# Patient Record
Sex: FEMALE | Race: WHITE | HISPANIC OR LATINO | URBAN - METROPOLITAN AREA
[De-identification: names, ages, dates, MRNs, and addresses within clinical notes are randomized per-mention and may not be internally consistent; named-entity substitution may affect disease eponyms.]

---

## 2018-05-19 ENCOUNTER — EMERGENCY (EMERGENCY)
Facility: HOSPITAL | Age: 43
LOS: 1 days | Discharge: HOME | End: 2018-05-19
Attending: EMERGENCY MEDICINE
Payer: COMMERCIAL

## 2018-05-19 VITALS
TEMPERATURE: 98 F | OXYGEN SATURATION: 99 % | SYSTOLIC BLOOD PRESSURE: 145 MMHG | HEART RATE: 95 BPM | DIASTOLIC BLOOD PRESSURE: 89 MMHG | RESPIRATION RATE: 16 BRPM

## 2018-05-19 VITALS
DIASTOLIC BLOOD PRESSURE: 92 MMHG | HEIGHT: 65 IN | SYSTOLIC BLOOD PRESSURE: 144 MMHG | TEMPERATURE: 98 F | HEART RATE: 97 BPM | WEIGHT: 160.06 LBS | RESPIRATION RATE: 18 BRPM

## 2018-05-19 LAB
ALBUMIN SERPL ELPH-MCNC: 4.1 G/DL — SIGNIFICANT CHANGE UP (ref 3.3–5)
ALP SERPL-CCNC: 69 U/L — SIGNIFICANT CHANGE UP (ref 40–120)
ALT FLD-CCNC: 15 U/L — SIGNIFICANT CHANGE UP (ref 10–45)
ANION GAP SERPL CALC-SCNC: 12 MMOL/L — SIGNIFICANT CHANGE UP (ref 5–17)
AST SERPL-CCNC: 11 U/L — SIGNIFICANT CHANGE UP (ref 10–40)
BASOPHILS # BLD AUTO: 0.1 K/UL — SIGNIFICANT CHANGE UP (ref 0–0.2)
BASOPHILS NFR BLD AUTO: 0.5 % — SIGNIFICANT CHANGE UP (ref 0–2)
BILIRUB SERPL-MCNC: 0.3 MG/DL — SIGNIFICANT CHANGE UP (ref 0.2–1.2)
BUN SERPL-MCNC: 9 MG/DL — SIGNIFICANT CHANGE UP (ref 7–23)
CALCIUM SERPL-MCNC: 9.2 MG/DL — SIGNIFICANT CHANGE UP (ref 8.4–10.5)
CHLORIDE SERPL-SCNC: 101 MMOL/L — SIGNIFICANT CHANGE UP (ref 96–108)
CO2 SERPL-SCNC: 23 MMOL/L — SIGNIFICANT CHANGE UP (ref 22–31)
CREAT SERPL-MCNC: 0.63 MG/DL — SIGNIFICANT CHANGE UP (ref 0.5–1.3)
EOSINOPHIL # BLD AUTO: 0.5 K/UL — SIGNIFICANT CHANGE UP (ref 0–0.5)
EOSINOPHIL NFR BLD AUTO: 3.8 % — SIGNIFICANT CHANGE UP (ref 0–6)
GLUCOSE SERPL-MCNC: 88 MG/DL — SIGNIFICANT CHANGE UP (ref 70–99)
HCT VFR BLD CALC: 40 % — SIGNIFICANT CHANGE UP (ref 34.5–45)
HGB BLD-MCNC: 13.1 G/DL — SIGNIFICANT CHANGE UP (ref 11.5–15.5)
LYMPHOCYTES # BLD AUTO: 28.5 % — SIGNIFICANT CHANGE UP (ref 13–44)
LYMPHOCYTES # BLD AUTO: 3.4 K/UL — HIGH (ref 1–3.3)
MCHC RBC-ENTMCNC: 29 PG — SIGNIFICANT CHANGE UP (ref 27–34)
MCHC RBC-ENTMCNC: 32.9 GM/DL — SIGNIFICANT CHANGE UP (ref 32–36)
MCV RBC AUTO: 88.2 FL — SIGNIFICANT CHANGE UP (ref 80–100)
MONOCYTES # BLD AUTO: 0.9 K/UL — SIGNIFICANT CHANGE UP (ref 0–0.9)
MONOCYTES NFR BLD AUTO: 7.4 % — SIGNIFICANT CHANGE UP (ref 2–14)
NEUTROPHILS # BLD AUTO: 7 K/UL — SIGNIFICANT CHANGE UP (ref 1.8–7.4)
NEUTROPHILS NFR BLD AUTO: 59.8 % — SIGNIFICANT CHANGE UP (ref 43–77)
PLATELET # BLD AUTO: 308 K/UL — SIGNIFICANT CHANGE UP (ref 150–400)
POTASSIUM SERPL-MCNC: 3.8 MMOL/L — SIGNIFICANT CHANGE UP (ref 3.5–5.3)
POTASSIUM SERPL-SCNC: 3.8 MMOL/L — SIGNIFICANT CHANGE UP (ref 3.5–5.3)
PROT SERPL-MCNC: 7.2 G/DL — SIGNIFICANT CHANGE UP (ref 6–8.3)
RBC # BLD: 4.54 M/UL — SIGNIFICANT CHANGE UP (ref 3.8–5.2)
RBC # FLD: 11.8 % — SIGNIFICANT CHANGE UP (ref 10.3–14.5)
SODIUM SERPL-SCNC: 136 MMOL/L — SIGNIFICANT CHANGE UP (ref 135–145)
TROPONIN T SERPL-MCNC: <0.01 NG/ML — SIGNIFICANT CHANGE UP (ref 0–0.06)
TROPONIN T SERPL-MCNC: <0.01 NG/ML — SIGNIFICANT CHANGE UP (ref 0–0.06)
WBC # BLD: 11.7 K/UL — HIGH (ref 3.8–10.5)
WBC # FLD AUTO: 11.7 K/UL — HIGH (ref 3.8–10.5)

## 2018-05-19 PROCEDURE — 71046 X-RAY EXAM CHEST 2 VIEWS: CPT | Mod: 26

## 2018-05-19 PROCEDURE — 93005 ELECTROCARDIOGRAM TRACING: CPT

## 2018-05-19 PROCEDURE — 80053 COMPREHEN METABOLIC PANEL: CPT

## 2018-05-19 PROCEDURE — 99053 MED SERV 10PM-8AM 24 HR FAC: CPT

## 2018-05-19 PROCEDURE — 99283 EMERGENCY DEPT VISIT LOW MDM: CPT

## 2018-05-19 PROCEDURE — 71046 X-RAY EXAM CHEST 2 VIEWS: CPT

## 2018-05-19 PROCEDURE — 85027 COMPLETE CBC AUTOMATED: CPT

## 2018-05-19 PROCEDURE — 93010 ELECTROCARDIOGRAM REPORT: CPT

## 2018-05-19 PROCEDURE — 84484 ASSAY OF TROPONIN QUANT: CPT

## 2018-05-19 PROCEDURE — 99285 EMERGENCY DEPT VISIT HI MDM: CPT | Mod: 25

## 2018-05-19 RX ORDER — ASPIRIN/CALCIUM CARB/MAGNESIUM 324 MG
325 TABLET ORAL ONCE
Qty: 0 | Refills: 0 | Status: COMPLETED | OUTPATIENT
Start: 2018-05-19 | End: 2018-05-19

## 2018-05-19 RX ORDER — KETOROLAC TROMETHAMINE 30 MG/ML
15 SYRINGE (ML) INJECTION ONCE
Qty: 0 | Refills: 0 | Status: DISCONTINUED | OUTPATIENT
Start: 2018-05-19 | End: 2018-05-19

## 2018-05-19 RX ADMIN — Medication 325 MILLIGRAM(S): at 08:08

## 2018-05-19 NOTE — ED PROVIDER NOTE - MEDICAL DECISION MAKING DETAILS
Jonathan Weil, PGY1 - HEART score = 1, low risk for ACS, PERC negative. Will check serial Daisha, provide ASA.

## 2018-05-19 NOTE — ED ADULT NURSE NOTE - OBJECTIVE STATEMENT
42 y f came to the ed c/o chest pain and back pain. states the chest pain started 3 days ago and the back pain has been a long time due to heavy lifting at her job. states the pain is worse with movement but improves with rest. 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 denies any fevers, chills, sob. abdomen is soft and nontender. denies any n/v/d. skin is warm and dry.

## 2018-05-19 NOTE — ED PROVIDER NOTE - OBJECTIVE STATEMENT
42F no sig PMH p/w intermittent right parasternal chest pain for 2 days. She describes a sensation of pressure, worse with deep inspiration, associated with dyspnea and 1x brief episode nausea last night. No vomiting/diaphoresis. Also c/o chronic diffuse back pain for months-years, unchanged from baseline. 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 She has not tried any meds for relief. No hx DM/HTN/high cholesterol/smoking. No Fhx early cardiac disease. No hx malignancy, no recent travel or surgeries, no leg pain, +chronic BLE edema for months.

## 2018-05-19 NOTE — ED PROVIDER NOTE - PROGRESS NOTE DETAILS
Jonathan Weil, PGY1 - patient feeling better. Trops negative x2. DC home with analgesia, pcp f/u, return precautions.

## 2018-05-19 NOTE — ED PROVIDER NOTE - ATTENDING CONTRIBUTION TO CARE
42F no pmhx presents to the ED for chest pain. Pain along right sternal border pressure constant non-radiating. started 2 days ago. worse with movement. 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 + nausea and slight dizziness. no f/c/sob/ha/abd pain/vomiting. no leg swelling no recent travel or surgeries. no skin rash. On exam pt well appearing clear lungs rrr abd soft non-tender no rebound or guarding no cvat no leg swelling. + ttp along right sternal border. The patient is low risk for a diagnosis of pulmonary embolism as demonstrated by being negative by the PERC rule: they are younger than 50, the pulse is <100, the oxygen saturation on room air is >95%, they have no history of prior venous thromboembolic disease, they have not had any recent surgery or significant trauma within the last 4 weeks, they deny hemoptysis, they do not take exogenous estrogen, and they do not have unilateral leg swelling. likely msk pain; heart score 0. will delta trop and if negative will d/c with follow up.     Constitutional: No fever or chills  Eyes: No visual changes  HEENT: No throat pain  CV: + chest pain  Resp: No SOB no cough  GI: No abd pain, + nausea no vomiting  : No dysuria  MSK: No musculoskeletal pain  Skin: No rash  Neuro: No headache     Constitutional: NAD AAOx3  Eyes: PERRLA EOMI  Head: Normocephalic atraumatic  Mouth: MMM  Cardiac: regular rate  no LE swelling  Resp: Lungs CTAB  GI: Abd s/nt/nd  Neuro: CN2-12 intact  Skin: No rashes     Kush Lee M.D., Attending Physician

## 2018-06-25 PROBLEM — Z00.00 ENCOUNTER FOR PREVENTIVE HEALTH EXAMINATION: Status: ACTIVE | Noted: 2018-06-25

## 2018-06-26 ENCOUNTER — APPOINTMENT (OUTPATIENT)
Dept: MAMMOGRAPHY | Facility: IMAGING CENTER | Age: 43
End: 2018-06-26

## 2018-07-10 ENCOUNTER — OUTPATIENT (OUTPATIENT)
Dept: OUTPATIENT SERVICES | Facility: HOSPITAL | Age: 43
LOS: 1 days | End: 2018-07-10
Payer: COMMERCIAL

## 2018-07-10 ENCOUNTER — APPOINTMENT (OUTPATIENT)
Dept: MAMMOGRAPHY | Facility: IMAGING CENTER | Age: 43
End: 2018-07-10
Payer: COMMERCIAL

## 2018-07-10 DIAGNOSIS — Z00.8 ENCOUNTER FOR OTHER GENERAL EXAMINATION: ICD-10-CM

## 2018-07-10 PROCEDURE — 77067 SCR MAMMO BI INCL CAD: CPT | Mod: 26

## 2018-07-10 PROCEDURE — 77063 BREAST TOMOSYNTHESIS BI: CPT | Mod: 26

## 2018-07-10 PROCEDURE — 77067 SCR MAMMO BI INCL CAD: CPT

## 2018-07-10 PROCEDURE — 77063 BREAST TOMOSYNTHESIS BI: CPT

## 2018-09-25 ENCOUNTER — RESULT REVIEW (OUTPATIENT)
Age: 43
End: 2018-09-25

## 2018-10-12 ENCOUNTER — APPOINTMENT (OUTPATIENT)
Dept: MRI IMAGING | Facility: CLINIC | Age: 43
End: 2018-10-12

## 2019-02-08 ENCOUNTER — APPOINTMENT (OUTPATIENT)
Dept: MRI IMAGING | Facility: IMAGING CENTER | Age: 44
End: 2019-02-08
Payer: COMMERCIAL

## 2019-02-08 ENCOUNTER — OUTPATIENT (OUTPATIENT)
Dept: OUTPATIENT SERVICES | Facility: HOSPITAL | Age: 44
LOS: 1 days | End: 2019-02-08
Payer: COMMERCIAL

## 2019-02-08 DIAGNOSIS — Z00.8 ENCOUNTER FOR OTHER GENERAL EXAMINATION: ICD-10-CM

## 2019-02-08 PROCEDURE — 72197 MRI PELVIS W/O & W/DYE: CPT

## 2019-02-08 PROCEDURE — A9585: CPT

## 2019-02-08 PROCEDURE — 72197 MRI PELVIS W/O & W/DYE: CPT | Mod: 26

## 2019-07-01 ENCOUNTER — APPOINTMENT (OUTPATIENT)
Dept: MRI IMAGING | Facility: CLINIC | Age: 44
End: 2019-07-01
Payer: COMMERCIAL

## 2019-07-01 ENCOUNTER — OUTPATIENT (OUTPATIENT)
Dept: OUTPATIENT SERVICES | Facility: HOSPITAL | Age: 44
LOS: 1 days | End: 2019-07-01
Payer: COMMERCIAL

## 2019-07-01 DIAGNOSIS — G89.29 OTHER CHRONIC PAIN: ICD-10-CM

## 2019-07-01 DIAGNOSIS — Z00.8 ENCOUNTER FOR OTHER GENERAL EXAMINATION: ICD-10-CM

## 2019-07-01 DIAGNOSIS — M54.42 LUMBAGO WITH SCIATICA, LEFT SIDE: ICD-10-CM

## 2019-07-01 PROCEDURE — 72148 MRI LUMBAR SPINE W/O DYE: CPT

## 2019-07-01 PROCEDURE — 72148 MRI LUMBAR SPINE W/O DYE: CPT | Mod: 26

## 2019-09-26 ENCOUNTER — EMERGENCY (EMERGENCY)
Facility: HOSPITAL | Age: 44
LOS: 0 days | Discharge: HOME | End: 2019-09-27
Attending: EMERGENCY MEDICINE
Payer: OTHER MISCELLANEOUS

## 2019-09-26 VITALS
WEIGHT: 175.93 LBS | HEIGHT: 65 IN | OXYGEN SATURATION: 99 % | DIASTOLIC BLOOD PRESSURE: 96 MMHG | RESPIRATION RATE: 18 BRPM | TEMPERATURE: 97 F | SYSTOLIC BLOOD PRESSURE: 134 MMHG | HEART RATE: 95 BPM

## 2019-09-26 PROCEDURE — 99053 MED SERV 10PM-8AM 24 HR FAC: CPT

## 2019-09-26 PROCEDURE — 99284 EMERGENCY DEPT VISIT MOD MDM: CPT

## 2019-09-26 NOTE — ED ADULT TRIAGE NOTE - CHIEF COMPLAINT QUOTE
patient states I was working yesterday overnight, at 0630 hours and me and a nurse pulled a patient up and I sprained my lower back. also reports pelvic pain. took flexeril at 9pm

## 2019-09-27 VITALS
SYSTOLIC BLOOD PRESSURE: 132 MMHG | TEMPERATURE: 98 F | DIASTOLIC BLOOD PRESSURE: 88 MMHG | OXYGEN SATURATION: 99 % | RESPIRATION RATE: 18 BRPM | HEART RATE: 89 BPM

## 2019-09-27 LAB
ALBUMIN SERPL ELPH-MCNC: 3.3 G/DL — SIGNIFICANT CHANGE UP (ref 3.3–5)
ALP SERPL-CCNC: 73 U/L — SIGNIFICANT CHANGE UP (ref 40–120)
ALT FLD-CCNC: 19 U/L — SIGNIFICANT CHANGE UP (ref 12–78)
ANION GAP SERPL CALC-SCNC: 4 MMOL/L — LOW (ref 5–17)
APPEARANCE UR: CLEAR — SIGNIFICANT CHANGE UP
APTT BLD: 28.9 SEC — SIGNIFICANT CHANGE UP (ref 28.5–37)
AST SERPL-CCNC: 10 U/L — LOW (ref 15–37)
BILIRUB SERPL-MCNC: 0.1 MG/DL — LOW (ref 0.2–1.2)
BILIRUB UR-MCNC: NEGATIVE — SIGNIFICANT CHANGE UP
BUN SERPL-MCNC: 9 MG/DL — SIGNIFICANT CHANGE UP (ref 7–23)
CALCIUM SERPL-MCNC: 8.3 MG/DL — LOW (ref 8.5–10.1)
CHLORIDE SERPL-SCNC: 105 MMOL/L — SIGNIFICANT CHANGE UP (ref 96–108)
CO2 SERPL-SCNC: 29 MMOL/L — SIGNIFICANT CHANGE UP (ref 22–31)
COLOR SPEC: YELLOW — SIGNIFICANT CHANGE UP
CREAT SERPL-MCNC: 0.65 MG/DL — SIGNIFICANT CHANGE UP (ref 0.5–1.3)
DIFF PNL FLD: NEGATIVE — SIGNIFICANT CHANGE UP
GLUCOSE SERPL-MCNC: 105 MG/DL — HIGH (ref 70–99)
GLUCOSE UR QL: NEGATIVE MG/DL — SIGNIFICANT CHANGE UP
HCG SERPL-ACNC: <1 MIU/ML — SIGNIFICANT CHANGE UP
HCT VFR BLD CALC: 38.8 % — SIGNIFICANT CHANGE UP (ref 34.5–45)
HGB BLD-MCNC: 12.4 G/DL — SIGNIFICANT CHANGE UP (ref 11.5–15.5)
INR BLD: 1.03 RATIO — SIGNIFICANT CHANGE UP (ref 0.88–1.16)
KETONES UR-MCNC: NEGATIVE — SIGNIFICANT CHANGE UP
LEUKOCYTE ESTERASE UR-ACNC: NEGATIVE — SIGNIFICANT CHANGE UP
MCHC RBC-ENTMCNC: 28 PG — SIGNIFICANT CHANGE UP (ref 27–34)
MCHC RBC-ENTMCNC: 32 GM/DL — SIGNIFICANT CHANGE UP (ref 32–36)
MCV RBC AUTO: 87.6 FL — SIGNIFICANT CHANGE UP (ref 80–100)
NITRITE UR-MCNC: NEGATIVE — SIGNIFICANT CHANGE UP
NRBC # BLD: 0 /100 WBCS — SIGNIFICANT CHANGE UP (ref 0–0)
PH UR: 6 — SIGNIFICANT CHANGE UP (ref 5–8)
PLATELET # BLD AUTO: 397 K/UL — SIGNIFICANT CHANGE UP (ref 150–400)
POTASSIUM SERPL-MCNC: 4.2 MMOL/L — SIGNIFICANT CHANGE UP (ref 3.5–5.3)
POTASSIUM SERPL-SCNC: 4.2 MMOL/L — SIGNIFICANT CHANGE UP (ref 3.5–5.3)
PROT SERPL-MCNC: 7 GM/DL — SIGNIFICANT CHANGE UP (ref 6–8.3)
PROT UR-MCNC: NEGATIVE MG/DL — SIGNIFICANT CHANGE UP
PROTHROM AB SERPL-ACNC: 11.6 SEC — SIGNIFICANT CHANGE UP (ref 10–12.9)
RBC # BLD: 4.43 M/UL — SIGNIFICANT CHANGE UP (ref 3.8–5.2)
RBC # FLD: 13.2 % — SIGNIFICANT CHANGE UP (ref 10.3–14.5)
SODIUM SERPL-SCNC: 138 MMOL/L — SIGNIFICANT CHANGE UP (ref 135–145)
SP GR SPEC: 1.01 — SIGNIFICANT CHANGE UP (ref 1.01–1.02)
UROBILINOGEN FLD QL: NEGATIVE MG/DL — SIGNIFICANT CHANGE UP
WBC # BLD: 12.24 K/UL — HIGH (ref 3.8–10.5)
WBC # FLD AUTO: 12.24 K/UL — HIGH (ref 3.8–10.5)

## 2019-09-27 PROCEDURE — 76856 US EXAM PELVIC COMPLETE: CPT | Mod: 26

## 2019-09-27 RX ORDER — IBUPROFEN 200 MG
600 TABLET ORAL ONCE
Refills: 0 | Status: COMPLETED | OUTPATIENT
Start: 2019-09-27 | End: 2019-09-27

## 2019-09-27 RX ORDER — OXYCODONE AND ACETAMINOPHEN 5; 325 MG/1; MG/1
1 TABLET ORAL ONCE
Refills: 0 | Status: DISCONTINUED | OUTPATIENT
Start: 2019-09-27 | End: 2019-09-27

## 2019-09-27 RX ORDER — METHOCARBAMOL 500 MG/1
1500 TABLET, FILM COATED ORAL ONCE
Refills: 0 | Status: COMPLETED | OUTPATIENT
Start: 2019-09-27 | End: 2019-09-27

## 2019-09-27 RX ORDER — DIAZEPAM 5 MG
2 TABLET ORAL ONCE
Refills: 0 | Status: DISCONTINUED | OUTPATIENT
Start: 2019-09-27 | End: 2019-09-27

## 2019-09-27 RX ORDER — DIAZEPAM 5 MG
1 TABLET ORAL
Qty: 9 | Refills: 0
Start: 2019-09-27 | End: 2019-09-29

## 2019-09-27 RX ADMIN — OXYCODONE AND ACETAMINOPHEN 1 TABLET(S): 5; 325 TABLET ORAL at 05:01

## 2019-09-27 RX ADMIN — Medication 2 MILLIGRAM(S): at 05:01

## 2019-09-27 RX ADMIN — Medication 600 MILLIGRAM(S): at 03:05

## 2019-09-27 RX ADMIN — METHOCARBAMOL 1500 MILLIGRAM(S): 500 TABLET, FILM COATED ORAL at 02:25

## 2019-09-27 RX ADMIN — Medication 600 MILLIGRAM(S): at 02:25

## 2019-09-27 NOTE — ED ADULT NURSE REASSESSMENT NOTE - NS ED NURSE REASSESS COMMENT FT1
Assuming patient's care for coverage. Report received from RN and patient informed during rounding. Assessment available on KB. Will continue to monitor
sent to akhil
on cell phone In waiting room , will continue to monitor

## 2019-09-27 NOTE — ED PROVIDER NOTE - CARE PROVIDER_API CALL
Chay Gary)  Orthopaedic Surgery  [facility] [place] [address]  Phone: [phone]  Fax: [phone]  Follow Up Time: 4-6 Days

## 2019-09-27 NOTE — ED PROVIDER NOTE - PATIENT PORTAL LINK FT
You can access the FollowMyHealth Patient Portal offered by [facility] by registering at the following website: [email or web address]. By joining [employer]’s FollowMyHealth portal, you will also be able to view your health information using other applications (apps) compatible with our system.

## 2019-09-27 NOTE — ED PROVIDER NOTE - OBJECTIVE STATEMENT
43 yo F with lower abd pain, radiating to lower back.  Pt. 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  Pt. denies urinary complaints.  She tried advil at home with no relief.  No other complaints/inciting event/trauma.  No radiation of pain down legs.  ROS: negative for fever, cough, headache, chest pain, shortness of breath, abd pain, nausea, vomiting, diarrhea, rash, paresthesia, and weakness--all other systems reviewed are negative.   PMH: negative; Meds: Denies; SH: Denies smoking/drinking/drug use

## 2019-09-27 NOTE — ED ADULT NURSE NOTE - OBJECTIVE STATEMENT
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

## 2019-09-27 NOTE — ED PROVIDER NOTE - CLINICAL SUMMARY MEDICAL DECISION MAKING FREE TEXT BOX
45 yo F with acute on chronic lower back pain, no indication for lumbar imaging at this time, r/o pelvic pathology   -basic labs, hcg, ua, cx, coags, us pelvis, iv, pain control  -f/u results, reeval

## 2019-09-28 DIAGNOSIS — R10.30 LOWER ABDOMINAL PAIN, UNSPECIFIED: ICD-10-CM

## 2019-09-28 DIAGNOSIS — M54.5 LOW BACK PAIN: ICD-10-CM

## 2019-09-28 LAB
CULTURE RESULTS: SIGNIFICANT CHANGE UP
SPECIMEN SOURCE: SIGNIFICANT CHANGE UP

## 2019-10-14 ENCOUNTER — APPOINTMENT (OUTPATIENT)
Dept: PHYSICAL MEDICINE AND REHAB | Facility: CLINIC | Age: 44
End: 2019-10-14
Payer: OTHER MISCELLANEOUS

## 2019-10-14 VITALS
HEART RATE: 96 BPM | BODY MASS INDEX: 29.16 KG/M2 | WEIGHT: 175 LBS | SYSTOLIC BLOOD PRESSURE: 138 MMHG | HEIGHT: 65 IN | DIASTOLIC BLOOD PRESSURE: 91 MMHG

## 2019-10-14 PROCEDURE — 99203 OFFICE O/P NEW LOW 30 MIN: CPT

## 2019-10-14 NOTE — PHYSICAL EXAM
[FreeTextEntry1] : Gen: mild distress, sitting in chair\par HEENT: neck supple\par CV: no cyanosis\par Pulm: breathing well on room air\par Abd: soft\par Low back: range of motion limited by pain, tenderness to palpation lower lumbar paraspinals and SI joint bilaterally, +seated straight leg raise bilaterally\par Msk: 4+/5 RLE, 4+/5 LLE, exam limited by pain\par Neuro: sensation intact to light touch in bilateral lower extremities, reflexes 2+ BLE\par

## 2019-10-14 NOTE — ASSESSMENT
[FreeTextEntry1] : 45 yo F who presents for follow up with low back pain with radiation into bilateral lower extremities consistent with lumbar radiculopathy.\par \par -Start PT and HEP\par -Start medrol dose pack, dispense 1 pack\par -MRI lumbar spine w/o contrast ordered.\par -Return to work note written to keep patient out of work for two weeks.\par -RTC following MRI lumbar spine.  If pain persists or worsen despite compliance with above, then will consider lumbar TFESI.\par \par Alexandre Boyle MD\par Spine and Sports Medicine\par \par [employer]\par At [facility]\par \par

## 2019-10-14 NOTE — HISTORY OF PRESENT ILLNESS
[FreeTextEntry1] : 44 year old F with no PMH who presents with low back pain with radiation into left/right lower extremity.  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  No inciting trauma or fall.  Pain localized to the bilateral side of low back.  Pain feels sharp in character.  Pain is worse with sitting, standing, and walking and improves with rest.  Pain is graded as 10/10 in severity.  Patient has tried percocet 5/325 mg, valium, and tylenol without improvement in his pain.  No previous epidural corticosteroid injections.  No previous surgeries to the low back.  Patient denies new weakness, numbness, or paresthesia.  Denies bowel/bladder dysfunction, fevers, chills, weight loss, night pain, or night sweats.\par

## 2019-10-17 ENCOUNTER — OUTPATIENT (OUTPATIENT)
Dept: OUTPATIENT SERVICES | Facility: HOSPITAL | Age: 44
LOS: 1 days | End: 2019-10-17
Payer: COMMERCIAL

## 2019-10-17 ENCOUNTER — APPOINTMENT (OUTPATIENT)
Dept: MRI IMAGING | Facility: CLINIC | Age: 44
End: 2019-10-17
Payer: OTHER MISCELLANEOUS

## 2019-10-17 DIAGNOSIS — Z00.8 ENCOUNTER FOR OTHER GENERAL EXAMINATION: ICD-10-CM

## 2019-10-17 PROCEDURE — 72148 MRI LUMBAR SPINE W/O DYE: CPT

## 2019-10-17 PROCEDURE — 72148 MRI LUMBAR SPINE W/O DYE: CPT | Mod: 26

## 2019-10-28 ENCOUNTER — APPOINTMENT (OUTPATIENT)
Dept: PHYSICAL MEDICINE AND REHAB | Facility: CLINIC | Age: 44
End: 2019-10-28
Payer: OTHER MISCELLANEOUS

## 2019-10-28 VITALS
DIASTOLIC BLOOD PRESSURE: 86 MMHG | WEIGHT: 175 LBS | HEART RATE: 90 BPM | HEIGHT: 65 IN | BODY MASS INDEX: 29.16 KG/M2 | SYSTOLIC BLOOD PRESSURE: 130 MMHG

## 2019-10-28 PROCEDURE — 99214 OFFICE O/P EST MOD 30 MIN: CPT

## 2019-10-29 NOTE — PHYSICAL EXAM
[FreeTextEntry1] : Gen: mild distress, standing\par HEENT: neck supple\par CV: no cyanosis\par Pulm: breathing well on room air\par Abd: soft\par Low back: range of motion limited by pain, tenderness to palpation lower lumbar paraspinals and bilateral sciatic notch, +straight leg raise bilaterally, neg FABERE, neg FAIR\par Msk: \par 5/5 hip flexion B/L, 5/5 knee extension B/L, 5/5 knee flexion B/L, 5/5 dorsiflexion B/L, 5/5 plantar flexion B/L\par 5/5 shoulder abduction B/L, 5/5 elbow flexion B/L, 5/5 elbow extension B/L, 5/5 wrist extension B/L, 5/5 hand  B/L\par Neuro: sensation intact to light touch in bilateral upper and lower extremities, reflexes 2+ brachioradialis, biceps, triceps bilaterally, reflexes 2+ patella, medial hamstring, achilles bilaterally, negative babinski, negative currie\par \par

## 2019-10-29 NOTE — ASSESSMENT
[FreeTextEntry1] : 43 yo F who presents for follow up with low back pain with radiation into bilateral lower extremities consistent with lumbar radiculopathy.  Patient reports significant improvement with PT, HEP, and medrol dose pack.  However, pain continues to persist.  Several treatment options discussed with patient and we have decided to continue conservative management for 2 additional weeks.\par \par -Continue PT and HEP\par -Start mobic 15 mg PO qdaily x 7 days, please take with food.  Patient denies history of gastritis, CAD, or CKD.  Instructed patient to stop taking medication if she develops GI symptoms including abdominal pain, nausea, and vomiting.\par -MRI lumbar spine reviewed with patient on this visit\par -Return to work note written to keep patient out of work for two additional weeks.\par -RTC in 2 weeks.  If pain persists or worsen despite compliance with above, then will consider [place] TFESI.\par \par Alexandre Boyle MD\par Spine and Sports Medicine\par \par [employer]\par At [facility]\par \par

## 2019-11-11 ENCOUNTER — APPOINTMENT (OUTPATIENT)
Dept: PHYSICAL MEDICINE AND REHAB | Facility: CLINIC | Age: 44
End: 2019-11-11
Payer: OTHER MISCELLANEOUS

## 2019-11-11 VITALS
HEIGHT: 65 IN | DIASTOLIC BLOOD PRESSURE: 85 MMHG | BODY MASS INDEX: 29.16 KG/M2 | WEIGHT: 175 LBS | SYSTOLIC BLOOD PRESSURE: 127 MMHG | HEART RATE: 92 BPM

## 2019-11-11 PROCEDURE — 99214 OFFICE O/P EST MOD 30 MIN: CPT

## 2019-11-11 NOTE — ASSESSMENT
[FreeTextEntry1] : 43 yo F who presents for follow up with low back pain with radiation into bilateral lower extremities consistent with lumbar radiculopathy and neck pain likely secondary to cervical facet syndrome/cervicalgia.  Pain in the low back is resolving with conservative treatment, however neck pain has persisted.\par \par -MRI cervical spine w/o contrast ordered\par -Continue PT and HEP, new prescription provided\par -Start cyclobenzaprine 15 mg PO qHS, dispense 30.\par -Return to work note written to keep patient out of work for two additional weeks.\par -RTC following MRI to review results.\par \par Alexandre Boyle MD\par Spine and Sports Medicine\par \par [employer]\par At [facility]\par \par

## 2019-11-11 NOTE — HISTORY OF PRESENT ILLNESS
[FreeTextEntry1] : 44 year old F who presents for follow up with low back and neck pain.  In the interim since her last visit, patient reports that she has participated in PT and HEP and completed the prescribe course of mobic with significant improvement in her pain.  Pain in the low back has now almost completely resolved.  However, patient complains that she has neck pain that started after her injury on 9/25/19.  Patient reports that this pain is improving but persists and is now 5/10 in severity.  Pain localized to right lower cervical spine without radiation.  Worse with neck movements and neck extension.  Improved with PT and rest.   Patient has also been compliant with recommendations for medrol dose pack.  Patient denies new trauma or falls.   Patient denies new weakness, numbness or paresthesia.  Patient denies bowel/bladder dysfunction, fevers, chills, weight loss, night pain, or night sweats.\par

## 2019-11-11 NOTE — PHYSICAL EXAM
[FreeTextEntry1] : Gen: mild distress, standing\par Neck: supple, ROM limited by pain, tenderness to palpation lower cervical paraspinals, +lower cervical facet loading bilaterally, +muscle spasm\par CV: no cyanosis\par Pulm: breathing well on room air\par Abd: soft\par Low back: range of motion limited by pain, tenderness to palpation lower lumbar paraspinals and bilateral sciatic notch improved from prior exam, +muscle spasm, neg straight leg raise bilaterally, neg FABERE, neg FAIR\par Msk: \par 5/5 hip flexion B/L, 5/5 knee extension B/L, 5/5 knee flexion B/L, 5/5 dorsiflexion B/L, 5/5 plantar flexion B/L\par 5/5 shoulder abduction B/L, 5/5 elbow flexion B/L, 5/5 elbow extension B/L, 5/5 wrist extension B/L, 5/5 hand  B/L\par Neuro: sensation intact to light touch in bilateral upper and lower extremities, reflexes 2+ brachioradialis, biceps, triceps bilaterally, reflexes 2+ patella, medial hamstring, achilles bilaterally, negative babinski, negative currie\par \par

## 2019-11-19 ENCOUNTER — FORM ENCOUNTER (OUTPATIENT)
Age: 44
End: 2019-11-19

## 2019-11-20 ENCOUNTER — OUTPATIENT (OUTPATIENT)
Dept: OUTPATIENT SERVICES | Facility: HOSPITAL | Age: 44
LOS: 1 days | End: 2019-11-20
Payer: COMMERCIAL

## 2019-11-20 ENCOUNTER — APPOINTMENT (OUTPATIENT)
Dept: MRI IMAGING | Facility: CLINIC | Age: 44
End: 2019-11-20
Payer: COMMERCIAL

## 2019-11-20 DIAGNOSIS — Z00.8 ENCOUNTER FOR OTHER GENERAL EXAMINATION: ICD-10-CM

## 2019-11-20 PROCEDURE — 72141 MRI NECK SPINE W/O DYE: CPT

## 2019-11-20 PROCEDURE — 72141 MRI NECK SPINE W/O DYE: CPT | Mod: 26

## 2019-12-02 ENCOUNTER — APPOINTMENT (OUTPATIENT)
Dept: PHYSICAL MEDICINE AND REHAB | Facility: CLINIC | Age: 44
End: 2019-12-02
Payer: OTHER MISCELLANEOUS

## 2019-12-02 VITALS
HEIGHT: 65 IN | WEIGHT: 175 LBS | HEART RATE: 82 BPM | BODY MASS INDEX: 29.16 KG/M2 | DIASTOLIC BLOOD PRESSURE: 87 MMHG | SYSTOLIC BLOOD PRESSURE: 138 MMHG

## 2019-12-02 PROCEDURE — 99214 OFFICE O/P EST MOD 30 MIN: CPT

## 2019-12-02 NOTE — PHYSICAL EXAM
[FreeTextEntry1] : Gen: mild distress, standing\par Neck: supple, ROM limited by pain, tenderness to palpation lower cervical paraspinals, + spurling right,+lower cervical facet loading bilaterally, +muscle spasm\par CV: no cyanosis\par Pulm: breathing well on room air\par Abd: soft\par Low back: range of motion limited by pain, tenderness to palpation lower lumbar paraspinals and bilateral sciatic notch improved from prior exam, +muscle spasm improved from prior exam, neg straight leg raise bilaterally, neg FABERE, neg FAIR\par Msk: \par 5/5 hip flexion B/L, 5/5 knee extension B/L, 5/5 knee flexion B/L, 5/5 dorsiflexion B/L, 5/5 plantar flexion B/L\par 5/5 shoulder abduction B/L, 5/5 elbow flexion B/L, 5/5 elbow extension B/L, 5/5 wrist extension B/L, 5/5 hand  B/L\par Neuro: sensation intact to light touch in bilateral upper and lower extremities, reflexes 2+ brachioradialis, biceps, triceps bilaterally, reflexes 2+ patella, medial hamstring, achilles bilaterally, negative babinski, negative currie\par \par

## 2019-12-02 NOTE — ASSESSMENT
[FreeTextEntry1] : 43 yo F who presents for follow up with low back pain with radiation into bilateral lower extremities consistent with lumbar radiculopathy and neck pain likely secondary to cervical radiculopathy and cervical facet syndrome/cervicalgia.  Pain in the low back is resolving with conservative treatment, however neck pain has persisted.\par \par -I recommend that patient undergo right C5-C6 TFESI.  Will submit for insurance approval.  Once insurance approval has been attained, patient will be contacted to schedule injection at out-patient ambulatory center.\par -MRI cervical spine w/o contrast reviewed\par -Continue PT and HEP, new prescription provided\par -Start acupuncture\par -Continue cyclobenzaprine 15 mg PO qHS, dispense 30.\par -Return to work note written to keep patient out of work for three additional weeks.\par -RTC for right C5-C6 TFESI\par \par Alexandre Boyle MD\par Spine and Sports Medicine\par \par [employer]\par At [facility]\par \par

## 2019-12-02 NOTE — HISTORY OF PRESENT ILLNESS
[FreeTextEntry1] : 12/2/19\par 45 yo F who presents for follow up with low back and neck pain.  Patient reports that low back pain is near complete resolution.  However, patient continues to complain of neck pain with radiation right upper extremity.  Pain is worse with neck movements and improves with rest.  Pain has been refractory to conservative care with PT/HEP, mobic, and PO corticosteroids.  Patient denies previous injections.  Denies new weakness, numbness or paresthesia.  Denies bowel/bladder dysfunction, fevers, chills, weight loss, night pain, or night sweats.\par \par \par 11/11/19\par 44 year old F who presents for follow up with low back and neck pain.  In the interim since her last visit, patient reports that she has participated in PT and HEP and completed the prescribe course of mobic with significant improvement in her pain.  Pain in the low back has now almost completely resolved.  However, patient complains that she has neck pain that started after her injury on 9/25/19.  Patient reports that this pain is improving but persists and is now 5/10 in severity.  Pain localized to right lower cervical spine without radiation.  Worse with neck movements and neck extension.  Improved with PT and rest.   Patient has also been compliant with recommendations for medrol dose pack.  Patient denies new trauma or falls.   Patient denies new weakness, numbness or paresthesia.  Patient denies bowel/bladder dysfunction, fevers, chills, weight loss, night pain, or night sweats.\par

## 2020-01-13 ENCOUNTER — APPOINTMENT (OUTPATIENT)
Dept: PHYSICAL MEDICINE AND REHAB | Facility: CLINIC | Age: 45
End: 2020-01-13
Payer: OTHER MISCELLANEOUS

## 2020-01-13 VITALS
HEIGHT: 65 IN | SYSTOLIC BLOOD PRESSURE: 145 MMHG | BODY MASS INDEX: 28.32 KG/M2 | HEART RATE: 89 BPM | WEIGHT: 170 LBS | DIASTOLIC BLOOD PRESSURE: 93 MMHG

## 2020-01-13 PROCEDURE — 99214 OFFICE O/P EST MOD 30 MIN: CPT

## 2020-01-13 NOTE — HISTORY OF PRESENT ILLNESS
[FreeTextEntry1] : 1/13/20\par 45 yo F who presents for follow up with low back and neck pain.  Patient reports that she continues to have neck pain that radiates into the right upper extremity.  Neck pain has significantly improved since her last visit but is still moderate to severe in intensity.  Patient continues to take cyclobenzaprine with significant improvement in her pain.  Patient denies new weakness, numbness or paresthesia.  Denies bowel/bladder dysfunction, fevers, chills, weight loss, night pain, or night sweats.\par \par 12/2/19\par 45 yo F who presents for follow up with low back and neck pain.  Patient reports that low back pain is near complete resolution.  However, patient continues to complain of neck pain with radiation right upper extremity.  Pain is worse with neck movements and improves with rest.  Pain has been refractory to conservative care with PT/HEP, mobic, and PO corticosteroids.  Patient denies previous injections.  Denies new weakness, numbness or paresthesia.  Denies bowel/bladder dysfunction, fevers, chills, weight loss, night pain, or night sweats.\par \par \par 11/11/19\par 44 year old F who presents for follow up with low back and neck pain.  In the interim since her last visit, patient reports that she has participated in PT and HEP and completed the prescribe course of mobic with significant improvement in her pain.  Pain in the low back has now almost completely resolved.  However, patient complains that she has neck pain that started after her injury on 9/25/19.  Patient reports that this pain is improving but persists and is now 5/10 in severity.  Pain localized to right lower cervical spine without radiation.  Worse with neck movements and neck extension.  Improved with PT and rest.   Patient has also been compliant with recommendations for medrol dose pack.  Patient denies new trauma or falls.   Patient denies new weakness, numbness or paresthesia.  Patient denies bowel/bladder dysfunction, fevers, chills, weight loss, night pain, or night sweats.\par

## 2020-01-13 NOTE — PHYSICAL EXAM
[FreeTextEntry1] : Gen: mild distress, standing\par Neck: supple, ROM limited by pain, tenderness to palpation lower cervical paraspinals, + spurling right,mild pain with cervical facet loading bilaterally, +muscle spasm\par CV: no cyanosis\par Pulm: breathing well on room air\par Abd: soft\par Low back: range of motion limited by pain, tenderness to palpation lower lumbar paraspinals and bilateral sciatic notch improved from prior exam, +muscle spasm improved from prior exam, neg straight leg raise bilaterally, neg FABERE, neg FAIR\par Msk: \par 5/5 hip flexion B/L, 5/5 knee extension B/L, 5/5 knee flexion B/L, 5/5 dorsiflexion B/L, 5/5 plantar flexion B/L\par 5/5 shoulder abduction B/L, 5/5 elbow flexion B/L, 5/5 elbow extension B/L, 5/5 wrist extension B/L, 5/5 hand  B/L\par Neuro: sensation intact to light touch in bilateral upper and lower extremities, reflexes 2+ brachioradialis, biceps, triceps bilaterally, reflexes 2+ patella, medial hamstring, achilles bilaterally, negative babinski, negative currie\par \par

## 2020-01-13 NOTE — ASSESSMENT
[FreeTextEntry1] : 45 yo F who presents for follow up with low back pain with radiation into bilateral lower extremities consistent with lumbar radiculopathy and neck pain likely secondary to cervical radiculopathy and cervical facet syndrome/cervicalgia.  Pain in the low back is resolving with conservative treatment, however neck pain has persisted.\par \par -I recommend that patient undergo right C7-T1 ILESI.  Will submit for insurance approval.  Once insurance approval has been obttained, patient will be contacted to schedule injection at out-patient ambulatory center.\par -MRI cervical spine w/o contrast reviewed\par -Continue PT and HEP, new prescription provided\par -Continue acupuncture\par -Refill cyclobenzaprine 15 mg PO qHS, dispense 30.\par -Return to work note written to keep patient out of work for three additional weeks.\par -RTC for right C7-T1 ILESI\par \par Alexandre Boyle MD\par Spine and Sports Medicine\par \par [employer]\par At [facility]\par \par

## 2020-01-17 ENCOUNTER — APPOINTMENT (OUTPATIENT)
Dept: PHYSICAL MEDICINE AND REHAB | Facility: CLINIC | Age: 45
End: 2020-01-17

## 2020-01-17 ENCOUNTER — OUTPATIENT (OUTPATIENT)
Dept: OUTPATIENT SERVICES | Facility: HOSPITAL | Age: 45
LOS: 1 days | End: 2020-01-17
Payer: COMMERCIAL

## 2020-01-17 DIAGNOSIS — M54.12 RADICULOPATHY, CERVICAL REGION: ICD-10-CM

## 2020-01-17 PROCEDURE — 62321 NJX INTERLAMINAR CRV/THRC: CPT

## 2020-01-17 PROCEDURE — 62321 NJX INTERLAMINAR CRV/THRC: CPT | Mod: RT

## 2020-01-21 DIAGNOSIS — M54.13 RADICULOPATHY, CERVICOTHORACIC REGION: ICD-10-CM

## 2020-02-05 ENCOUNTER — APPOINTMENT (OUTPATIENT)
Dept: PHYSICAL MEDICINE AND REHAB | Facility: CLINIC | Age: 45
End: 2020-02-05
Payer: OTHER MISCELLANEOUS

## 2020-02-05 VITALS — OXYGEN SATURATION: 99 % | DIASTOLIC BLOOD PRESSURE: 85 MMHG | SYSTOLIC BLOOD PRESSURE: 124 MMHG | HEART RATE: 70 BPM

## 2020-02-05 PROCEDURE — 99214 OFFICE O/P EST MOD 30 MIN: CPT

## 2020-02-05 NOTE — ASSESSMENT
[FreeTextEntry1] : 43 yo F who presents for follow up with low back pain with radiation into bilateral lower extremities consistent with lumbar radiculopathy and neck pain likely secondary to cervical radiculopathy and cervical facet syndrome/cervicalgia.  Pain in the low back is resolving with conservative treatment, however neck pain has persisted.\par \par -Patient underwent right C7-T1 ILESI on 1/17/20 with significant improvement in her pain.\par -Re-start PT and HEP, new prescription provided\par -Return to work note written to keep patient out of work for the next month to ensure relative rest and participation with PT/HEP.\par -RTC 2-3 weeks.\par \par Alexandre Boyle MD\par Spine and Sports Medicine\par \par [employer]\par At [facility]\par \par

## 2020-02-05 NOTE — PHYSICAL EXAM
[FreeTextEntry1] : Gen: mild distress, standing\par Neck: supple, ROM limited by pain, tenderness to palpation lower cervical paraspinals significantly improved from previous exam, neg spurling, mild muscle spasm\par CV: no cyanosis\par Pulm: breathing well on room air\par Abd: soft\par Low back: range of motion limited by pain, tenderness to palpation lower lumbar paraspinals and bilateral sciatic notch improved from prior exam, +muscle spasm improved from prior exam, neg straight leg raise bilaterally, neg FABERE, neg FAIR\par Msk: \par 5/5 hip flexion B/L, 5/5 knee extension B/L, 5/5 knee flexion B/L, 5/5 dorsiflexion B/L, 5/5 plantar flexion B/L\par 5/5 shoulder abduction B/L, 5/5 elbow flexion B/L, 5/5 elbow extension B/L, 5/5 wrist extension B/L, 5/5 hand  B/L\par Neuro: sensation intact to light touch in bilateral upper and lower extremities, reflexes 2+ brachioradialis, biceps, triceps bilaterally, reflexes 2+ patella, medial hamstring, achilles bilaterally, negative babinski, negative currie\par \par

## 2020-02-05 NOTE — HISTORY OF PRESENT ILLNESS
[FreeTextEntry1] : 2/5/20\par 43 yo F who presents for follow up with low back and neck pain.  Patient reports that pain has improved significantly following right C7-T1 ILESI on 1/17/20.  Patient reports that pain is mild and is now 3/10 in severity.  Patient reports no PT/HEP since her last injection.  Patient denies new weakness, numbness or paresthesia.  Denies bowel/bladder dysfunction, fevers, chills, weight loss, night pain, or night sweats.\par \par 1/13/20\par 43 yo F who presents for follow up with low back and neck pain.  Patient reports that she continues to have neck pain that radiates into the right upper extremity.  Neck pain has significantly improved since her last visit but is still moderate to severe in intensity.  Patient continues to take cyclobenzaprine with significant improvement in her pain.  Patient denies new weakness, numbness or paresthesia.  Denies bowel/bladder dysfunction, fevers, chills, weight loss, night pain, or night sweats.\par \par 12/2/19\par 43 yo F who presents for follow up with low back and neck pain.  Patient reports that low back pain is near complete resolution.  However, patient continues to complain of neck pain with radiation right upper extremity.  Pain is worse with neck movements and improves with rest.  Pain has been refractory to conservative care with PT/HEP, mobic, and PO corticosteroids.  Patient denies previous injections.  Denies new weakness, numbness or paresthesia.  Denies bowel/bladder dysfunction, fevers, chills, weight loss, night pain, or night sweats.\par \par \par 11/11/19\par 44 year old F who presents for follow up with low back and neck pain.  In the interim since her last visit, patient reports that she has participated in PT and HEP and completed the prescribe course of mobic with significant improvement in her pain.  Pain in the low back has now almost completely resolved.  However, patient complains that she has neck pain that started after her injury on 9/25/19.  Patient reports that this pain is improving but persists and is now 5/10 in severity.  Pain localized to right lower cervical spine without radiation.  Worse with neck movements and neck extension.  Improved with PT and rest.   Patient has also been compliant with recommendations for medrol dose pack.  Patient denies new trauma or falls.   Patient denies new weakness, numbness or paresthesia.  Patient denies bowel/bladder dysfunction, fevers, chills, weight loss, night pain, or night sweats.\par

## 2020-02-26 ENCOUNTER — APPOINTMENT (OUTPATIENT)
Dept: PHYSICAL MEDICINE AND REHAB | Facility: CLINIC | Age: 45
End: 2020-02-26
Payer: OTHER MISCELLANEOUS

## 2020-02-26 VITALS — DIASTOLIC BLOOD PRESSURE: 87 MMHG | HEART RATE: 76 BPM | SYSTOLIC BLOOD PRESSURE: 133 MMHG

## 2020-02-26 PROCEDURE — 99214 OFFICE O/P EST MOD 30 MIN: CPT

## 2020-02-26 NOTE — ASSESSMENT
[FreeTextEntry1] : 43 yo F who presents for follow up with low back pain with radiation into bilateral lower extremities consistent with lumbar radiculopathy and neck pain likely secondary to cervical radiculopathy and cervical facet syndrome/cervicalgia.  Pain in neck and low back is near complete resolution and patient will be cleared to start light duty with a restriction of no heavy lifting above 10 lbs.\par \par -Continue PT/HEP, new referral provided\par -Start mobic 15 mg PO qdaily x 7 days, recommend to take with food.  Denies CKD, CAD, or gastritis.  Recommend that if patient develops GI symptoms including abdominal pain, nausea, or vomiting.\par -Return to work note written clearing patient to work light duty with the restriction of no heavy lifting above 10 lbs.\par -RTC 3-4 weeks, will consider advancing patient to full duty without restriction at next visit.\par \par Alexandre Boyle MD\par Spine and Sports Medicine\par \par [employer]\par At [facility]\par \par

## 2020-02-26 NOTE — HISTORY OF PRESENT ILLNESS
[FreeTextEntry1] : 2/26/20\par 45 yo F who presents for follow up with low back and neck pain.  Patient reports almost complete resolution of her neck pain since her last appointment.  Patient continues to participate in PT and HEP and is continuing to see progress in pain.  Patient denies new weakness, numbness or paresthesia.  Denies bowel/bladder dysfunction, fevers, chills, weight loss, night pain, or night sweats.\par \par 2/5/20\par 45 yo F who presents for follow up with low back and neck pain.  Patient reports that pain has improved significantly following right C7-T1 ILESI on 1/17/20.  Patient reports that pain is mild and is now 3/10 in severity.  Patient reports no PT/HEP since her last injection.  Patient denies new weakness, numbness or paresthesia.  Denies bowel/bladder dysfunction, fevers, chills, weight loss, night pain, or night sweats.\par \par 1/13/20\par 45 yo F who presents for follow up with low back and neck pain.  Patient reports that she continues to have neck pain that radiates into the right upper extremity.  Neck pain has significantly improved since her last visit but is still moderate to severe in intensity.  Patient continues to take cyclobenzaprine with significant improvement in her pain.  Patient denies new weakness, numbness or paresthesia.  Denies bowel/bladder dysfunction, fevers, chills, weight loss, night pain, or night sweats.\par \par 12/2/19\par 45 yo F who presents for follow up with low back and neck pain.  Patient reports that low back pain is near complete resolution.  However, patient continues to complain of neck pain with radiation right upper extremity.  Pain is worse with neck movements and improves with rest.  Pain has been refractory to conservative care with PT/HEP, mobic, and PO corticosteroids.  Patient denies previous injections.  Denies new weakness, numbness or paresthesia.  Denies bowel/bladder dysfunction, fevers, chills, weight loss, night pain, or night sweats.\par \par \par 11/11/19\par 44 year old F who presents for follow up with low back and neck pain.  In the interim since her last visit, patient reports that she has participated in PT and HEP and completed the prescribe course of mobic with significant improvement in her pain.  Pain in the low back has now almost completely resolved.  However, patient complains that she has neck pain that started after her injury on 9/25/19.  Patient reports that this pain is improving but persists and is now 5/10 in severity.  Pain localized to right lower cervical spine without radiation.  Worse with neck movements and neck extension.  Improved with PT and rest.   Patient has also been compliant with recommendations for medrol dose pack.  Patient denies new trauma or falls.   Patient denies new weakness, numbness or paresthesia.  Patient denies bowel/bladder dysfunction, fevers, chills, weight loss, night pain, or night sweats.\par

## 2020-02-26 NOTE — PHYSICAL EXAM
[FreeTextEntry1] : Gen: NAD, sitting\par Neck: supple, ROM limited by pain with full extension but significantly improved since her last appointment, mild tenderness to palpation lower cervical paraspinals significantly improved from previous exam, neg spurling, mild muscle spasm\par CV: no cyanosis\par Pulm: breathing well on room air\par Abd: soft\par Low back: range of motion limited by pain, tenderness to palpation lower lumbar paraspinals and bilateral sciatic notch improved from prior exam, +muscle spasm improved from prior exam, neg straight leg raise bilaterally, neg FABERE, neg FAIR\par Msk: \par 5/5 hip flexion B/L, 5/5 knee extension B/L, 5/5 knee flexion B/L, 5/5 dorsiflexion B/L, 5/5 plantar flexion B/L\par 5/5 shoulder abduction B/L, 5/5 elbow flexion B/L, 5/5 elbow extension B/L, 5/5 wrist extension B/L, 5/5 hand  B/L\par Neuro: sensation intact to light touch in bilateral upper and lower extremities, reflexes 2+ brachioradialis, biceps, triceps bilaterally, reflexes 2+ patella, medial hamstring, achilles bilaterally, negative babinski, negative currie\par \par

## 2020-04-17 ENCOUNTER — APPOINTMENT (OUTPATIENT)
Dept: PHYSICAL MEDICINE AND REHAB | Facility: CLINIC | Age: 45
End: 2020-04-17
Payer: OTHER MISCELLANEOUS

## 2020-04-17 VITALS
TEMPERATURE: 98.2 F | SYSTOLIC BLOOD PRESSURE: 128 MMHG | HEART RATE: 93 BPM | DIASTOLIC BLOOD PRESSURE: 84 MMHG | OXYGEN SATURATION: 99 %

## 2020-04-17 PROCEDURE — 99214 OFFICE O/P EST MOD 30 MIN: CPT

## 2020-04-17 NOTE — PHYSICAL EXAM
[FreeTextEntry1] : Gen: NAD, sitting\par Neck: supple, ROM near full but continues to be limited by pain, +muscle spasm, mild tenderness to palpation lower cervical paraspinals significantly improved from previous exam, neg spurling, mild muscle spasm\par CV: no cyanosis\par Pulm: breathing well on room air\par Abd: soft\par Low back: range of motion limited by pain, tenderness to palpation lower lumbar paraspinals and bilateral sciatic notch improved from prior exam, +muscle spasm improved from prior exam, neg straight leg raise bilaterally, neg FABERE, neg FAIR\par Msk: \par 5/5 hip flexion B/L, 5/5 knee extension B/L, 5/5 knee flexion B/L, 5/5 dorsiflexion B/L, 5/5 plantar flexion B/L\par 5/5 shoulder abduction B/L, 5/5 elbow flexion B/L, 5/5 elbow extension B/L, 5/5 wrist extension B/L, 5/5 hand  B/L\par Neuro: sensation intact to light touch in bilateral upper and lower extremities, reflexes 2+ brachioradialis, biceps, triceps bilaterally, reflexes 2+ patella, medial hamstring, achilles bilaterally, negative babinski, negative currie\par \par

## 2020-04-17 NOTE — HISTORY OF PRESENT ILLNESS
[FreeTextEntry1] : 4/17/20\par 43 yo F who presents for follow up with low back and neck pain.  Patient reports that she has started light duty at work with periodic worsening of pain especially at the end of her shift.  Patient reports that this symptom has improved with transitioning to day shift.  Patient reports that she continues to participated in PT/HEP with significant improvement.  Patient takes occasional mobic for pain relief.  Patient denies new weakness, numbness or paresthesia.  Denies bowel/bladder dysfunction, fevers, chills, weight loss, night pain, or night sweats.\par \par 2/26/20\par 43 yo F who presents for follow up with low back and neck pain.  Patient reports almost complete resolution of her neck pain since her last appointment.  Patient continues to participate in PT and HEP and is continuing to see progress in pain.  Patient denies new weakness, numbness or paresthesia.  Denies bowel/bladder dysfunction, fevers, chills, weight loss, night pain, or night sweats.\par \par 2/5/20\par 43 yo F who presents for follow up with low back and neck pain.  Patient reports that pain has improved significantly following right C7-T1 ILESI on 1/17/20.  Patient reports that pain is mild and is now 3/10 in severity.  Patient reports no PT/HEP since her last injection.  Patient denies new weakness, numbness or paresthesia.  Denies bowel/bladder dysfunction, fevers, chills, weight loss, night pain, or night sweats.\par \par 1/13/20\par 43 yo F who presents for follow up with low back and neck pain.  Patient reports that she continues to have neck pain that radiates into the right upper extremity.  Neck pain has significantly improved since her last visit but is still moderate to severe in intensity.  Patient continues to take cyclobenzaprine with significant improvement in her pain.  Patient denies new weakness, numbness or paresthesia.  Denies bowel/bladder dysfunction, fevers, chills, weight loss, night pain, or night sweats.\par \par 12/2/19\par 43 yo F who presents for follow up with low back and neck pain.  Patient reports that low back pain is near complete resolution.  However, patient continues to complain of neck pain with radiation right upper extremity.  Pain is worse with neck movements and improves with rest.  Pain has been refractory to conservative care with PT/HEP, mobic, and PO corticosteroids.  Patient denies previous injections.  Denies new weakness, numbness or paresthesia.  Denies bowel/bladder dysfunction, fevers, chills, weight loss, night pain, or night sweats.\par \par \par 11/11/19\par 44 year old F who presents for follow up with low back and neck pain.  In the interim since her last visit, patient reports that she has participated in PT and HEP and completed the prescribe course of mobic with significant improvement in her pain.  Pain in the low back has now almost completely resolved.  However, patient complains that she has neck pain that started after her injury on 9/25/19.  Patient reports that this pain is improving but persists and is now 5/10 in severity.  Pain localized to right lower cervical spine without radiation.  Worse with neck movements and neck extension.  Improved with PT and rest.   Patient has also been compliant with recommendations for medrol dose pack.  Patient denies new trauma or falls.   Patient denies new weakness, numbness or paresthesia.  Patient denies bowel/bladder dysfunction, fevers, chills, weight loss, night pain, or night sweats.\par

## 2020-04-17 NOTE — ASSESSMENT
[FreeTextEntry1] : 43 yo F who presents for follow up with low back pain with radiation into bilateral lower extremities consistent with lumbar radiculopathy and neck pain likely secondary to cervical radiculopathy and cervical facet syndrome/cervicalgia.  Pain in neck and low back is near complete resolution however with intermittent flares with the initiation of work.  Will continue with patient at light duty with a restriction of no heavy lifting above 10 lbs.\par \par -Continue PT/HEP, new referral provided\par -Start cyclobenzaprine 10 mg PO qHS PRN pain, dispense 30.  Patient denies a history of CHF, liver dysfunction or arrhythmias.\par -Return to work note written clearing patient to work light duty with the restriction of no heavy lifting above 10 lbs.\par -RTC 3-4 weeks, will consider advancing patient to full duty without restriction at next visit.\par \par Alexandre Boyle MD\par Spine and Sports Medicine\par \par [employer]\par At [facility]\par \par

## 2020-04-26 ENCOUNTER — MESSAGE (OUTPATIENT)
Age: 45
End: 2020-04-26

## 2020-05-13 LAB
SARS-COV-2 IGG SERPL IA-ACNC: 3.6 INDEX
SARS-COV-2 IGG SERPL QL IA: POSITIVE

## 2020-05-15 ENCOUNTER — APPOINTMENT (OUTPATIENT)
Dept: PHYSICAL MEDICINE AND REHAB | Facility: CLINIC | Age: 45
End: 2020-05-15
Payer: OTHER MISCELLANEOUS

## 2020-05-15 VITALS
DIASTOLIC BLOOD PRESSURE: 85 MMHG | BODY MASS INDEX: 29.29 KG/M2 | WEIGHT: 176 LBS | HEART RATE: 100 BPM | TEMPERATURE: 97.6 F | OXYGEN SATURATION: 98 % | SYSTOLIC BLOOD PRESSURE: 133 MMHG

## 2020-05-15 PROCEDURE — 99214 OFFICE O/P EST MOD 30 MIN: CPT

## 2020-05-15 NOTE — ASSESSMENT
[FreeTextEntry1] : 43 yo F who presents for follow up with low back pain with radiation into bilateral lower extremities consistent with lumbar radiculopathy and neck pain likely secondary to cervical radiculopathy and cervical facet syndrome/cervicalgia.  Pain recently flared to increase in work demands but patient reports that pain has significantly improved as work has become more manageable.  Will continue with patient at light duty with a restriction of no heavy lifting above 10 lbs.\par \par -Continue PT/HEP, new referral provided\par -Start mobic 15 mg PO qdaily x 14 days, recommend to take with food.  Denies CKD, CAD, or gastritis.  Recommend that if patient develops GI symptoms including abdominal pain, nausea, or vomiting to discontinue use of medication immediately.\par -Cont. cyclobenzaprine 10 mg PO qHS PRN pain, dispense 30.  Patient denies a history of CHF, liver dysfunction or arrhythmias.\par -Return to work note written clearing patient to work light duty with the restriction of no heavy lifting above 10 lbs.\par -RTC 3 weeks, will consider advancing patient to full duty without restriction at next visit.\par \par Alexandre Boyle MD\par Spine and Sports Medicine\par \par [employer]\par At [facility]\par \par

## 2020-05-15 NOTE — PHYSICAL EXAM
[FreeTextEntry1] : Gen: NAD, sitting\par Neck: supple, ROM near full but continues to be limited by pain, +muscle spasm, mild tenderness to palpation lower cervical paraspinals, neg spurling, mild muscle spasm\par CV: no cyanosis\par Pulm: breathing well on room air\par Abd: soft\par Low back: range of motion limited by pain, tenderness to palpation lower lumbar paraspinals and bilateral sciatic notch improved from prior exam, +muscle spasm, neg straight leg raise bilaterally, neg FABERE, neg FAIR\par Msk: \par 5/5 hip flexion B/L, 5/5 knee extension B/L, 5/5 knee flexion B/L, 5/5 dorsiflexion B/L, 5/5 plantar flexion B/L\par 5/5 shoulder abduction B/L, 5/5 elbow flexion B/L, 5/5 elbow extension B/L, 5/5 wrist extension B/L, 5/5 hand  B/L\par Neuro: sensation intact to light touch in bilateral upper and lower extremities, reflexes 2+ brachioradialis, biceps, triceps bilaterally, reflexes 2+ patella, medial hamstring, achilles bilaterally, negative babinski, negative currie\par \par

## 2020-05-15 NOTE — HISTORY OF PRESENT ILLNESS
[FreeTextEntry1] : 5/15/20\par 43 yo F who presents for follow up with low back and neck pain.  Patient reports that pain had been worse earlier this month but she reports that the demands at her work was very intense given the covid-19 outbreak.  Patient also reports that, due to work demands, she was only able to attend 1 PT session per week.  Patient continues to take cyclobenzaprine with significant improvement in her pain.  Patient reports that her work demands have steadily decreased as covid-19 improves and her pain has been significantly improved.  Pain now mild to moderate in intensity.  Patient denies new weakness, numbness or paresthesia.  Denies bowel/bladder dysfunction, fevers, chills, weight loss, night pain, or night sweats.\par \par 4/17/20\par 43 yo F who presents for follow up with low back and neck pain.  Patient reports that she has started light duty at work with periodic worsening of pain especially at the end of her shift.  Patient reports that this symptom has improved with transitioning to day shift.  Patient reports that she continues to participated in PT/HEP with significant improvement.  Patient takes occasional mobic for pain relief.  Patient denies new weakness, numbness or paresthesia.  Denies bowel/bladder dysfunction, fevers, chills, weight loss, night pain, or night sweats.\par \par 2/26/20\par 43 yo F who presents for follow up with low back and neck pain.  Patient reports almost complete resolution of her neck pain since her last appointment.  Patient continues to participate in PT and HEP and is continuing to see progress in pain.  Patient denies new weakness, numbness or paresthesia.  Denies bowel/bladder dysfunction, fevers, chills, weight loss, night pain, or night sweats.\par \par 2/5/20\par 43 yo F who presents for follow up with low back and neck pain.  Patient reports that pain has improved significantly following right C7-T1 ILESI on 1/17/20.  Patient reports that pain is mild and is now 3/10 in severity.  Patient reports no PT/HEP since her last injection.  Patient denies new weakness, numbness or paresthesia.  Denies bowel/bladder dysfunction, fevers, chills, weight loss, night pain, or night sweats.\par \par 1/13/20\par 43 yo F who presents for follow up with low back and neck pain.  Patient reports that she continues to have neck pain that radiates into the right upper extremity.  Neck pain has significantly improved since her last visit but is still moderate to severe in intensity.  Patient continues to take cyclobenzaprine with significant improvement in her pain.  Patient denies new weakness, numbness or paresthesia.  Denies bowel/bladder dysfunction, fevers, chills, weight loss, night pain, or night sweats.\par \par 12/2/19\par 43 yo F who presents for follow up with low back and neck pain.  Patient reports that low back pain is near complete resolution.  However, patient continues to complain of neck pain with radiation right upper extremity.  Pain is worse with neck movements and improves with rest.  Pain has been refractory to conservative care with PT/HEP, mobic, and PO corticosteroids.  Patient denies previous injections.  Denies new weakness, numbness or paresthesia.  Denies bowel/bladder dysfunction, fevers, chills, weight loss, night pain, or night sweats.\par \par \par 11/11/19\par 44 year old F who presents for follow up with low back and neck pain.  In the interim since her last visit, patient reports that she has participated in PT and HEP and completed the prescribe course of mobic with significant improvement in her pain.  Pain in the low back has now almost completely resolved.  However, patient complains that she has neck pain that started after her injury on 9/25/19.  Patient reports that this pain is improving but persists and is now 5/10 in severity.  Pain localized to right lower cervical spine without radiation.  Worse with neck movements and neck extension.  Improved with PT and rest.   Patient has also been compliant with recommendations for medrol dose pack.  Patient denies new trauma or falls.   Patient denies new weakness, numbness or paresthesia.  Patient denies bowel/bladder dysfunction, fevers, chills, weight loss, night pain, or night sweats.\par

## 2020-06-05 ENCOUNTER — APPOINTMENT (OUTPATIENT)
Dept: PHYSICAL MEDICINE AND REHAB | Facility: CLINIC | Age: 45
End: 2020-06-05
Payer: OTHER MISCELLANEOUS

## 2020-06-05 VITALS
DIASTOLIC BLOOD PRESSURE: 90 MMHG | HEART RATE: 89 BPM | SYSTOLIC BLOOD PRESSURE: 141 MMHG | TEMPERATURE: 97.6 F | OXYGEN SATURATION: 98 %

## 2020-06-05 DIAGNOSIS — M51.26 OTHER INTERVERTEBRAL DISC DISPLACEMENT, LUMBAR REGION: ICD-10-CM

## 2020-06-05 PROCEDURE — 99214 OFFICE O/P EST MOD 30 MIN: CPT

## 2020-06-05 NOTE — HISTORY OF PRESENT ILLNESS
[FreeTextEntry1] : 6/5/20\par 45 yo F who presents for follow up with low back and neck pain.  Patient reports that pain has improved markedly from her last appointment with further sessions of PT.  Patient reports taking mobic and cyclobenzaprine sporadically for pain with adequate relief.  Despite being on light duty, patient reports that she was named employee of the month and she feels quite comfortable in her new role at work.  Patient denies new weakness, numbness or paresthesia.  Denies bowel/bladder dysfunction, fevers, chills, weight loss, night pain, or night sweats.\par \par 5/15/20\par 45 yo F who presents for follow up with low back and neck pain.  Patient reports that pain had been worse earlier this month but she reports that the demands at her work was very intense given the covid-19 outbreak.  Patient also reports that, due to work demands, she was only able to attend 1 PT session per week.  Patient continues to take cyclobenzaprine with significant improvement in her pain.  Patient reports that her work demands have steadily decreased as covid-19 improves and her pain has been significantly improved.  Pain now mild to moderate in intensity.  Patient denies new weakness, numbness or paresthesia.  Denies bowel/bladder dysfunction, fevers, chills, weight loss, night pain, or night sweats.\par \par 4/17/20\par 45 yo F who presents for follow up with low back and neck pain.  Patient reports that she has started light duty at work with periodic worsening of pain especially at the end of her shift.  Patient reports that this symptom has improved with transitioning to day shift.  Patient reports that she continues to participated in PT/HEP with significant improvement.  Patient takes occasional mobic for pain relief.  Patient denies new weakness, numbness or paresthesia.  Denies bowel/bladder dysfunction, fevers, chills, weight loss, night pain, or night sweats.\par \par 2/26/20\par 45 yo F who presents for follow up with low back and neck pain.  Patient reports almost complete resolution of her neck pain since her last appointment.  Patient continues to participate in PT and HEP and is continuing to see progress in pain.  Patient denies new weakness, numbness or paresthesia.  Denies bowel/bladder dysfunction, fevers, chills, weight loss, night pain, or night sweats.\par \par 2/5/20\par 45 yo F who presents for follow up with low back and neck pain.  Patient reports that pain has improved significantly following right C7-T1 ILESI on 1/17/20.  Patient reports that pain is mild and is now 3/10 in severity.  Patient reports no PT/HEP since her last injection.  Patient denies new weakness, numbness or paresthesia.  Denies bowel/bladder dysfunction, fevers, chills, weight loss, night pain, or night sweats.\par \par 1/13/20\par 45 yo F who presents for follow up with low back and neck pain.  Patient reports that she continues to have neck pain that radiates into the right upper extremity.  Neck pain has significantly improved since her last visit but is still moderate to severe in intensity.  Patient continues to take cyclobenzaprine with significant improvement in her pain.  Patient denies new weakness, numbness or paresthesia.  Denies bowel/bladder dysfunction, fevers, chills, weight loss, night pain, or night sweats.\par \par 12/2/19\par 45 yo F who presents for follow up with low back and neck pain.  Patient reports that low back pain is near complete resolution.  However, patient continues to complain of neck pain with radiation right upper extremity.  Pain is worse with neck movements and improves with rest.  Pain has been refractory to conservative care with PT/HEP, mobic, and PO corticosteroids.  Patient denies previous injections.  Denies new weakness, numbness or paresthesia.  Denies bowel/bladder dysfunction, fevers, chills, weight loss, night pain, or night sweats.\par \par \par 11/11/19\par 44 year old F who presents for follow up with low back and neck pain.  In the interim since her last visit, patient reports that she has participated in PT and HEP and completed the prescribe course of mobic with significant improvement in her pain.  Pain in the low back has now almost completely resolved.  However, patient complains that she has neck pain that started after her injury on 9/25/19.  Patient reports that this pain is improving but persists and is now 5/10 in severity.  Pain localized to right lower cervical spine without radiation.  Worse with neck movements and neck extension.  Improved with PT and rest.   Patient has also been compliant with recommendations for medrol dose pack.  Patient denies new trauma or falls.   Patient denies new weakness, numbness or paresthesia.  Patient denies bowel/bladder dysfunction, fevers, chills, weight loss, night pain, or night sweats.\par

## 2020-06-05 NOTE — PHYSICAL EXAM
[FreeTextEntry1] : Gen: NAD, sitting\par Neck: supple, ROM near full but continues to be limited by pain, mild tenderness to palpation lower cervical paraspinals, neg spurling, mild muscle spasm\par CV: no cyanosis\par Pulm: breathing well on room air\par Abd: soft\par Low back: range of motion limited by pain, tenderness to palpation lower lumbar paraspinals and bilateral sciatic notch improved from prior exam, +muscle spasm, neg straight leg raise bilaterally, neg FABERE, neg FAIR\par Msk: \par 5/5 hip flexion B/L, 5/5 knee extension B/L, 5/5 knee flexion B/L, 5/5 dorsiflexion B/L, 5/5 plantar flexion B/L\par 5/5 shoulder abduction B/L, 5/5 elbow flexion B/L, 5/5 elbow extension B/L, 5/5 wrist extension B/L, 5/5 hand  B/L\par Neuro: sensation intact to light touch in bilateral upper and lower extremities, reflexes 2+ brachioradialis, biceps, triceps bilaterally, reflexes 2+ patella, medial hamstring, achilles bilaterally, negative babinski, negative currie\par \par

## 2020-06-05 NOTE — ASSESSMENT
[FreeTextEntry1] : 45 yo F who presents for follow up with low back pain with radiation into bilateral lower extremities consistent with lumbar radiculopathy and neck pain likely secondary to cervical radiculopathy and cervical facet syndrome/cervicalgia.  Pain recently flared to increase in work demands but patient reports that pain has significantly improved as work has become more manageable.  Will continue with patient at light duty with a restriction of no heavy lifting above 10 lbs.\par \par -Continue PT/HEP\par -Cont. mobic 15 mg PO qdaily x 14 days, recommend to take with food.  Denies CKD, CAD, or gastritis.  Recommend that if patient develops GI symptoms including abdominal pain, nausea, or vomiting to discontinue use of medication immediately.\par -Cont. cyclobenzaprine 10 mg PO qHS PRN pain, dispense 30.  Patient denies a history of CHF, liver dysfunction or arrhythmias.\par -Return to work note written clearing patient to work light duty with the restriction of no heavy lifting above 10 lbs.\par -RTC 3 weeks, will consider advancing patient to full duty without restriction at next visit.\par \par Alexandre Boyle MD\par Spine and Sports Medicine\par \par [employer]\par At [facility]\par \par

## 2020-06-26 ENCOUNTER — APPOINTMENT (OUTPATIENT)
Dept: PHYSICAL MEDICINE AND REHAB | Facility: CLINIC | Age: 45
End: 2020-06-26
Payer: OTHER MISCELLANEOUS

## 2020-06-26 VITALS
HEART RATE: 87 BPM | TEMPERATURE: 98.2 F | DIASTOLIC BLOOD PRESSURE: 86 MMHG | OXYGEN SATURATION: 98 % | SYSTOLIC BLOOD PRESSURE: 125 MMHG

## 2020-06-26 PROCEDURE — 99214 OFFICE O/P EST MOD 30 MIN: CPT

## 2020-06-26 NOTE — PHYSICAL EXAM
[FreeTextEntry1] : Gen: NAD, sitting\par Neck: supple, ROM near full but continues to be limited by pain, tenderness lower right cervical facet joints, pain with loading of lower right facets, neg spurling, mild muscle spasm\par Right shoulder: neg neer's, neg hawkin's\par CV: no cyanosis\par Pulm: breathing well on room air\par Abd: soft\par Low back: range of motion limited by pain, tenderness to palpation lower lumbar paraspinals and bilateral sciatic notch improved from prior exam, +muscle spasm, neg straight leg raise bilaterally, neg FABERE, neg FAIR\par Msk: \par 5/5 hip flexion B/L, 5/5 knee extension B/L, 5/5 knee flexion B/L, 5/5 dorsiflexion B/L, 5/5 plantar flexion B/L\par 5/5 shoulder abduction B/L, 5/5 elbow flexion B/L, 5/5 elbow extension B/L, 5/5 wrist extension B/L, 5/5 hand  B/L\par Neuro: sensation intact to light touch in bilateral upper and lower extremities, reflexes 2+ brachioradialis, biceps, triceps bilaterally, reflexes 2+ patella, medial hamstring, achilles bilaterally, negative babinski, negative currie\par \par

## 2020-06-26 NOTE — HISTORY OF PRESENT ILLNESS
[FreeTextEntry1] : 6/26/20\par 43 yo F who presents for follow up with low back and neck pain.  Patient reports that pain has been is about the same as last visit despite PT/HEP, mobic, cyclobenzaprine.  Patient reports that pain is present in lower cervical spine and worse with neck extension.  Previously, pain had radiated into right upper extremities but radicular symptoms have resolved following right C7-T1 interlaminar epidural injection on 1/17/20.  Patient reports pain is about 6-7/10.  Patient tolerating light duty at work but has been unable to progress to full duty due to her pain.  Patient denies new weakness, numbness or paresthesia.  Denies bowel/bladder dysfunction, fevers, chills, weight loss, night pain, or night sweats.\par \par 6/5/20\par 43 yo F who presents for follow up with low back and neck pain.  Patient reports that pain has improved markedly from her last appointment with further sessions of PT.  Patient reports taking mobic and cyclobenzaprine sporadically for pain with adequate relief.  Despite being on light duty, patient reports that she was named employee of the month and she feels quite comfortable in her new role at work.  Patient denies new weakness, numbness or paresthesia.  Denies bowel/bladder dysfunction, fevers, chills, weight loss, night pain, or night sweats.\par \par 5/15/20\par 43 yo F who presents for follow up with low back and neck pain.  Patient reports that pain had been worse earlier this month but she reports that the demands at her work was very intense given the covid-19 outbreak.  Patient also reports that, due to work demands, she was only able to attend 1 PT session per week.  Patient continues to take cyclobenzaprine with significant improvement in her pain.  Patient reports that her work demands have steadily decreased as covid-19 improves and her pain has been significantly improved.  Pain now mild to moderate in intensity.  Patient denies new weakness, numbness or paresthesia.  Denies bowel/bladder dysfunction, fevers, chills, weight loss, night pain, or night sweats.\par \par 4/17/20\par 43 yo F who presents for follow up with low back and neck pain.  Patient reports that she has started light duty at work with periodic worsening of pain especially at the end of her shift.  Patient reports that this symptom has improved with transitioning to day shift.  Patient reports that she continues to participated in PT/HEP with significant improvement.  Patient takes occasional mobic for pain relief.  Patient denies new weakness, numbness or paresthesia.  Denies bowel/bladder dysfunction, fevers, chills, weight loss, night pain, or night sweats.\par \par 2/26/20\par 43 yo F who presents for follow up with low back and neck pain.  Patient reports almost complete resolution of her neck pain since her last appointment.  Patient continues to participate in PT and HEP and is continuing to see progress in pain.  Patient denies new weakness, numbness or paresthesia.  Denies bowel/bladder dysfunction, fevers, chills, weight loss, night pain, or night sweats.\par \par 2/5/20\par 43 yo F who presents for follow up with low back and neck pain.  Patient reports that pain has improved significantly following right C7-T1 ILESI on 1/17/20.  Patient reports that pain is mild and is now 3/10 in severity.  Patient reports no PT/HEP since her last injection.  Patient denies new weakness, numbness or paresthesia.  Denies bowel/bladder dysfunction, fevers, chills, weight loss, night pain, or night sweats.\par \par 1/13/20\par 43 yo F who presents for follow up with low back and neck pain.  Patient reports that she continues to have neck pain that radiates into the right upper extremity.  Neck pain has significantly improved since her last visit but is still moderate to severe in intensity.  Patient continues to take cyclobenzaprine with significant improvement in her pain.  Patient denies new weakness, numbness or paresthesia.  Denies bowel/bladder dysfunction, fevers, chills, weight loss, night pain, or night sweats.\par \par 12/2/19\par 43 yo F who presents for follow up with low back and neck pain.  Patient reports that low back pain is near complete resolution.  However, patient continues to complain of neck pain with radiation right upper extremity.  Pain is worse with neck movements and improves with rest.  Pain has been refractory to conservative care with PT/HEP, mobic, and PO corticosteroids.  Patient denies previous injections.  Denies new weakness, numbness or paresthesia.  Denies bowel/bladder dysfunction, fevers, chills, weight loss, night pain, or night sweats.\par \par \par 11/11/19\par 44 year old F who presents for follow up with low back and neck pain.  In the interim since her last visit, patient reports that she has participated in PT and HEP and completed the prescribe course of mobic with significant improvement in her pain.  Pain in the low back has now almost completely resolved.  However, patient complains that she has neck pain that started after her injury on 9/25/19.  Patient reports that this pain is improving but persists and is now 5/10 in severity.  Pain localized to right lower cervical spine without radiation.  Worse with neck movements and neck extension.  Improved with PT and rest.   Patient has also been compliant with recommendations for medrol dose pack.  Patient denies new trauma or falls.   Patient denies new weakness, numbness or paresthesia.  Patient denies bowel/bladder dysfunction, fevers, chills, weight loss, night pain, or night sweats.\par

## 2020-06-26 NOTE — ASSESSMENT
[FreeTextEntry1] : 43 yo F who presents for follow up with low back pain with radiation into bilateral lower extremities consistent with lumbar radiculopathy and neck pain likely secondary to cervical radiculopathy and cervical facet syndrome/cervicalgia.  Will continue with patient at light duty with a restriction of no heavy lifting above 10 lbs.\par \par -Referral provided for Dr. Hernandez to consider cervical MBB of lower cervical spine\par -Continue PT/HEP.\par -Cont. cyclobenzaprine 10 mg PO qHS PRN pain, dispense 30.  Patient denies a history of CHF, liver dysfunction or arrhythmias.\par -Return to work note written clearing patient to work light duty with the restriction of no heavy lifting above 10 lbs.\par -RTC 6 weeks, will consider advancing patient to full duty without restriction at next visit.\par \par Alexandre Boyle MD\par Spine and Sports Medicine\par \par [employer]\par At [facility]\par \par

## 2020-07-01 ENCOUNTER — APPOINTMENT (OUTPATIENT)
Dept: PHYSICAL MEDICINE AND REHAB | Facility: CLINIC | Age: 45
End: 2020-07-01
Payer: OTHER MISCELLANEOUS

## 2020-07-01 VITALS
DIASTOLIC BLOOD PRESSURE: 96 MMHG | HEART RATE: 93 BPM | TEMPERATURE: 97 F | OXYGEN SATURATION: 99 % | SYSTOLIC BLOOD PRESSURE: 146 MMHG

## 2020-07-01 PROCEDURE — 99215 OFFICE O/P EST HI 40 MIN: CPT

## 2020-07-01 NOTE — ASSESSMENT
[FreeTextEntry1] : This is MIMI RILEY,  a 44 year-old female with neck pain 2/2 cervical spondylosis.  Previous JOHNY help moderately. Pain seems more facet mediated than radicular. Has had conservative management with PT and oral medication with no significant improvement\par \par - Bilateral cervical C5, C6, C7 MBB - discussed procedure details, risks/benefits\par - continue with HEP

## 2020-07-01 NOTE — DATA REVIEWED
[MRI] : MRI [FreeTextEntry1] : cervical MRI\par Mild to moderate multilevel cervical spondylosis that is most prominent at C5-C6 and C6-C7

## 2020-07-01 NOTE — PHYSICAL EXAM
[FreeTextEntry1] : General: NAD, alert\par Psych: normal mood and affect\par HEENT: NC/AT, normal visual tracking\par Pulmonary: no resp distress, chest expansion appears symmetrical\par CV: extremities are warm and perfused\par Abd: non-distended\par Ext: no c/c/e\par normal skin color and appearance\par \par Cervical Spine/Shoulder:\par Inspection: normal muscle bulk without asymmetry\par Tenderness to palpation: noted over bilateral L>R cervical paraspinals, upper trapezius\par ROM: within functional limits - pain with extension, mild with flexion\par MMT: 5/5 bilateral upper extremities\par Reflexes: symmetric bilateral biceps, triceps, brachioradialis\par Sensory: intact to light touch in all dermatomes of the bilateral upper extremities.\par Provocative testing:\par Spurlings negative \par Cota’s negative \par

## 2020-07-01 NOTE — HISTORY OF PRESENT ILLNESS
[FreeTextEntry1] : Ms. MIMI RILEY is a 44 year old female here as a referral from Dr. Boyle for cervical MBB. 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

## 2020-07-20 ENCOUNTER — FORM ENCOUNTER (OUTPATIENT)
Age: 45
End: 2020-07-20

## 2020-08-04 DIAGNOSIS — Z01.818 ENCOUNTER FOR OTHER PREPROCEDURAL EXAMINATION: ICD-10-CM

## 2020-08-06 ENCOUNTER — APPOINTMENT (OUTPATIENT)
Dept: DISASTER EMERGENCY | Facility: CLINIC | Age: 45
End: 2020-08-06

## 2020-08-06 LAB — SARS-COV-2 N GENE NPH QL NAA+PROBE: NOT DETECTED

## 2020-08-11 ENCOUNTER — APPOINTMENT (OUTPATIENT)
Dept: PHYSICAL MEDICINE AND REHAB | Facility: CLINIC | Age: 45
End: 2020-08-11

## 2020-08-11 ENCOUNTER — OUTPATIENT (OUTPATIENT)
Dept: OUTPATIENT SERVICES | Facility: HOSPITAL | Age: 45
LOS: 1 days | End: 2020-08-11
Payer: COMMERCIAL

## 2020-08-11 DIAGNOSIS — M54.12 RADICULOPATHY, CERVICAL REGION: ICD-10-CM

## 2020-08-11 PROCEDURE — 64490 INJ PARAVERT F JNT C/T 1 LEV: CPT

## 2020-08-11 PROCEDURE — 64491 INJ PARAVERT F JNT C/T 2 LEV: CPT | Mod: 50

## 2020-08-11 PROCEDURE — 64490 INJ PARAVERT F JNT C/T 1 LEV: CPT | Mod: 50

## 2020-08-11 PROCEDURE — 64491 INJ PARAVERT F JNT C/T 2 LEV: CPT

## 2020-08-13 DIAGNOSIS — M47.812 SPONDYLOSIS WITHOUT MYELOPATHY OR RADICULOPATHY, CERVICAL REGION: ICD-10-CM

## 2020-08-19 ENCOUNTER — APPOINTMENT (OUTPATIENT)
Dept: PHYSICAL MEDICINE AND REHAB | Facility: CLINIC | Age: 45
End: 2020-08-19
Payer: OTHER MISCELLANEOUS

## 2020-08-19 VITALS — DIASTOLIC BLOOD PRESSURE: 90 MMHG | SYSTOLIC BLOOD PRESSURE: 140 MMHG | OXYGEN SATURATION: 99 % | HEART RATE: 90 BPM

## 2020-08-19 PROCEDURE — 99213 OFFICE O/P EST LOW 20 MIN: CPT

## 2020-08-19 NOTE — ASSESSMENT
[FreeTextEntry1] : Return to work without restrictions on 8/24/20. Continue PT with transition to HEP. Follow up in 4-6 weeks. \par

## 2020-08-19 NOTE — HISTORY OF PRESENT ILLNESS
[FreeTextEntry1] : Patient is following up after bilateral C5/6/7 medial branch blocks. She reports about 95% improvement after the medial branch blocks. She is doing much better. She had pain/numbness in the right 4th and 5th digits (happened once yesterday) but otherwise no paresthesia. Used to have LUE radicular symptoms which occurred about 3 weeks ago. She had 1 cervical MARCIA done on 01/17/20.

## 2020-08-19 NOTE — PHYSICAL EXAM
[No Restrictions] : No work restrictions [FreeTextEntry1] : General: NAD, alert\par Psych: normal mood and affect\par HEENT: NC/AT, normal visual tracking\par Pulmonary: no resp distress, chest expansion appears symmetrical\par CV: extremities are warm and perfused\par Abd: non-distended\par Ext: no c/c/e\par normal skin color and appearance\par \par Cervical Spine/Shoulder:\par Inspection: normal muscle bulk without asymmetry\par No Tenderness to palpation: noted over bilateral L>R cervical paraspinals, upper trapezius\par ROM: within functional limits - no pain with extension, flexion\par MMT: 5/5 bilateral upper extremities\par Reflexes: symmetric bilateral biceps, triceps, brachioradialis\par Sensory: intact to light touch in all dermatomes of the bilateral upper extremities.\par Provocative testing:\par Spurlings negative \par Cota’s negative  [Fit For Work] : fit for work

## 2020-09-04 ENCOUNTER — APPOINTMENT (OUTPATIENT)
Dept: UROLOGY | Facility: CLINIC | Age: 45
End: 2020-09-04

## 2020-09-10 ENCOUNTER — RESULT REVIEW (OUTPATIENT)
Age: 45
End: 2020-09-10

## 2020-09-30 ENCOUNTER — APPOINTMENT (OUTPATIENT)
Dept: PHYSICAL MEDICINE AND REHAB | Facility: CLINIC | Age: 45
End: 2020-09-30
Payer: OTHER MISCELLANEOUS

## 2020-09-30 VITALS
OXYGEN SATURATION: 99 % | HEART RATE: 75 BPM | TEMPERATURE: 97.6 F | DIASTOLIC BLOOD PRESSURE: 99 MMHG | SYSTOLIC BLOOD PRESSURE: 150 MMHG

## 2020-09-30 PROCEDURE — 99214 OFFICE O/P EST MOD 30 MIN: CPT

## 2020-09-30 NOTE — ASSESSMENT
[FreeTextEntry1] : Worsening of neck pain and headache + bilateral paresthesia since returning back to work.  Recommended PT after procedure, but was not approved by .  S/p JOHNY (1/2020) and HUYEN JOHNSON (8/11/2020).  Will restart Meloxicam and PT.  She would benefit from repeat C7/T1 ILESI.\par \par Having left>right sided paresthesias. Had right sided JOHNY in the past which helped significantly. Will target left>right side but will stay closer to midline.

## 2020-09-30 NOTE — PHYSICAL EXAM
[No Restrictions] : No work restrictions [Fit For Work] : fit for work [FreeTextEntry1] : General: NAD, alert\par Psych: normal mood and affect\par HEENT: NC/AT, normal visual tracking\par Pulmonary: no resp distress, chest expansion appears symmetrical\par CV: extremities are warm and perfused\par Abd: non-distended\par Ext: no c/c/e\par normal skin color and appearance\par \par Cervical Spine/Shoulder:\par Inspection: normal muscle bulk without asymmetry\par No Tenderness to palpation: noted over bilateral L>R cervical paraspinals, upper trapezius\par ROM: within functional limits - no pain with extension, flexion\par MMT: 5/5 bilateral upper extremities\par Reflexes: symmetric bilateral biceps, triceps, brachioradialis\par Sensory: intact to light touch in all dermatomes of the bilateral upper extremities.\par Provocative testing:\par Spurlings equivocal to on left - more significant of axil neck pain\par Cota’s negative

## 2020-09-30 NOTE — HISTORY OF PRESENT ILLNESS
[FreeTextEntry1] : Patient is following up - s/p bilateral C5/6/7 medial branch blocks on 8/11/2020 - reported about 95% improvement.  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  Pain rated at 6/10, max 9-10/10 during work. Pain to axil of neck > bilateral arms. Reports paresthesia to left > right arm to 4-5th digits. Has been dropping things from hands.  Associated symptoms: headache worse with working (night shift).  Has been taking tylenol since she exhausted supply of meloxicam.\par \par \par

## 2020-10-04 ENCOUNTER — FORM ENCOUNTER (OUTPATIENT)
Age: 45
End: 2020-10-04

## 2020-10-11 ENCOUNTER — FORM ENCOUNTER (OUTPATIENT)
Age: 45
End: 2020-10-11

## 2020-11-16 ENCOUNTER — APPOINTMENT (OUTPATIENT)
Dept: UROLOGY | Facility: CLINIC | Age: 45
End: 2020-11-16

## 2021-03-04 ENCOUNTER — EMERGENCY (EMERGENCY)
Facility: HOSPITAL | Age: 46
LOS: 1 days | Discharge: HOME | End: 2021-03-04
Attending: EMERGENCY MEDICINE
Payer: COMMERCIAL

## 2021-03-04 VITALS
HEART RATE: 86 BPM | TEMPERATURE: 98 F | SYSTOLIC BLOOD PRESSURE: 151 MMHG | OXYGEN SATURATION: 99 % | DIASTOLIC BLOOD PRESSURE: 95 MMHG | RESPIRATION RATE: 18 BRPM

## 2021-03-04 VITALS — WEIGHT: 184.97 LBS | HEIGHT: 65 IN

## 2021-03-04 LAB
ALBUMIN SERPL ELPH-MCNC: 3.8 G/DL — SIGNIFICANT CHANGE UP (ref 3.3–5)
ALP SERPL-CCNC: 66 U/L — SIGNIFICANT CHANGE UP (ref 40–120)
ALT FLD-CCNC: 15 U/L — SIGNIFICANT CHANGE UP (ref 10–45)
ANION GAP SERPL CALC-SCNC: 10 MMOL/L — SIGNIFICANT CHANGE UP (ref 5–17)
AST SERPL-CCNC: 12 U/L — SIGNIFICANT CHANGE UP (ref 10–40)
BASOPHILS # BLD AUTO: 0.04 K/UL — SIGNIFICANT CHANGE UP (ref 0–0.2)
BASOPHILS NFR BLD AUTO: 0.3 % — SIGNIFICANT CHANGE UP (ref 0–2)
BILIRUB SERPL-MCNC: 0.1 MG/DL — LOW (ref 0.2–1.2)
BUN SERPL-MCNC: 14 MG/DL — SIGNIFICANT CHANGE UP (ref 7–23)
CALCIUM SERPL-MCNC: 9.1 MG/DL — SIGNIFICANT CHANGE UP (ref 8.4–10.5)
CHLORIDE SERPL-SCNC: 102 MMOL/L — SIGNIFICANT CHANGE UP (ref 96–108)
CO2 SERPL-SCNC: 22 MMOL/L — SIGNIFICANT CHANGE UP (ref 22–31)
CREAT SERPL-MCNC: 0.58 MG/DL — SIGNIFICANT CHANGE UP (ref 0.5–1.3)
EOSINOPHIL # BLD AUTO: 0.39 K/UL — SIGNIFICANT CHANGE UP (ref 0–0.5)
EOSINOPHIL NFR BLD AUTO: 2.9 % — SIGNIFICANT CHANGE UP (ref 0–6)
GLUCOSE SERPL-MCNC: 97 MG/DL — SIGNIFICANT CHANGE UP (ref 70–99)
HCT VFR BLD CALC: 38.6 % — SIGNIFICANT CHANGE UP (ref 34.5–45)
HGB BLD-MCNC: 12.5 G/DL — SIGNIFICANT CHANGE UP (ref 11.5–15.5)
IMM GRANULOCYTES NFR BLD AUTO: 0.5 % — SIGNIFICANT CHANGE UP (ref 0–1.5)
LYMPHOCYTES # BLD AUTO: 32.8 % — SIGNIFICANT CHANGE UP (ref 13–44)
LYMPHOCYTES # BLD AUTO: 4.47 K/UL — HIGH (ref 1–3.3)
MCHC RBC-ENTMCNC: 28.7 PG — SIGNIFICANT CHANGE UP (ref 27–34)
MCHC RBC-ENTMCNC: 32.4 GM/DL — SIGNIFICANT CHANGE UP (ref 32–36)
MCV RBC AUTO: 88.5 FL — SIGNIFICANT CHANGE UP (ref 80–100)
MONOCYTES # BLD AUTO: 1.03 K/UL — HIGH (ref 0–0.9)
MONOCYTES NFR BLD AUTO: 7.6 % — SIGNIFICANT CHANGE UP (ref 2–14)
NEUTROPHILS # BLD AUTO: 7.64 K/UL — HIGH (ref 1.8–7.4)
NEUTROPHILS NFR BLD AUTO: 55.9 % — SIGNIFICANT CHANGE UP (ref 43–77)
NRBC # BLD: 0 /100 WBCS — SIGNIFICANT CHANGE UP (ref 0–0)
PLATELET # BLD AUTO: 352 K/UL — SIGNIFICANT CHANGE UP (ref 150–400)
POTASSIUM SERPL-MCNC: 3.9 MMOL/L — SIGNIFICANT CHANGE UP (ref 3.5–5.3)
POTASSIUM SERPL-SCNC: 3.9 MMOL/L — SIGNIFICANT CHANGE UP (ref 3.5–5.3)
PROT SERPL-MCNC: 6.8 G/DL — SIGNIFICANT CHANGE UP (ref 6–8.3)
RBC # BLD: 4.36 M/UL — SIGNIFICANT CHANGE UP (ref 3.8–5.2)
RBC # FLD: 12.9 % — SIGNIFICANT CHANGE UP (ref 10.3–14.5)
SODIUM SERPL-SCNC: 134 MMOL/L — LOW (ref 135–145)
WBC # BLD: 13.64 K/UL — HIGH (ref 3.8–10.5)
WBC # FLD AUTO: 13.64 K/UL — HIGH (ref 3.8–10.5)

## 2021-03-04 PROCEDURE — 99053 MED SERV 10PM-8AM 24 HR FAC: CPT

## 2021-03-04 PROCEDURE — 84702 CHORIONIC GONADOTROPIN TEST: CPT

## 2021-03-04 PROCEDURE — 72125 CT NECK SPINE W/O DYE: CPT

## 2021-03-04 PROCEDURE — 99284 EMERGENCY DEPT VISIT MOD MDM: CPT | Mod: 25

## 2021-03-04 PROCEDURE — 96374 THER/PROPH/DIAG INJ IV PUSH: CPT

## 2021-03-04 PROCEDURE — 82962 GLUCOSE BLOOD TEST: CPT

## 2021-03-04 PROCEDURE — 85025 COMPLETE CBC W/AUTO DIFF WBC: CPT

## 2021-03-04 PROCEDURE — 93005 ELECTROCARDIOGRAM TRACING: CPT

## 2021-03-04 PROCEDURE — 72125 CT NECK SPINE W/O DYE: CPT | Mod: 26

## 2021-03-04 PROCEDURE — 70450 CT HEAD/BRAIN W/O DYE: CPT | Mod: 26

## 2021-03-04 PROCEDURE — 99285 EMERGENCY DEPT VISIT HI MDM: CPT

## 2021-03-04 PROCEDURE — 80053 COMPREHEN METABOLIC PANEL: CPT

## 2021-03-04 PROCEDURE — 70450 CT HEAD/BRAIN W/O DYE: CPT

## 2021-03-04 RX ORDER — METOCLOPRAMIDE HCL 10 MG
10 TABLET ORAL ONCE
Refills: 0 | Status: COMPLETED | OUTPATIENT
Start: 2021-03-04 | End: 2021-03-04

## 2021-03-04 RX ORDER — DEXAMETHASONE 0.5 MG/5ML
6 ELIXIR ORAL ONCE
Refills: 0 | Status: DISCONTINUED | OUTPATIENT
Start: 2021-03-04 | End: 2021-03-07

## 2021-03-04 RX ORDER — ACETAMINOPHEN 500 MG
975 TABLET ORAL ONCE
Refills: 0 | Status: COMPLETED | OUTPATIENT
Start: 2021-03-04 | End: 2021-03-04

## 2021-03-04 RX ORDER — SODIUM CHLORIDE 9 MG/ML
1000 INJECTION INTRAMUSCULAR; INTRAVENOUS; SUBCUTANEOUS ONCE
Refills: 0 | Status: COMPLETED | OUTPATIENT
Start: 2021-03-04 | End: 2021-03-04

## 2021-03-04 RX ADMIN — Medication 10 MILLIGRAM(S): at 03:32

## 2021-03-04 RX ADMIN — SODIUM CHLORIDE 1000 MILLILITER(S): 9 INJECTION INTRAMUSCULAR; INTRAVENOUS; SUBCUTANEOUS at 03:33

## 2021-03-04 RX ADMIN — Medication 975 MILLIGRAM(S): at 03:32

## 2021-03-04 NOTE — ED PROVIDER NOTE - OBJECTIVE STATEMENT
45y female presenting with a headache. Patient has had a headache for a week, has had similar headaches in the past but never for this long. Today developed intermittent left arm tingling, not having the tingling now. 45y female presenting with a headache. Patient has had a headache for a week, has had similar headaches in the past but never for this long. Has been having similar intermittent headaches since an accident at work a few years ago. Has also had intermittent arm tingling since the accident as well, not having the tingling now. Also had blurry vision yesterday, states that she has had similar episodes of blurry vision since her accident. Was suppose to see a neurologist, but they didn't take her insurance, so she came to the ED. No numbness, weakness, fevers, nausea, vomiting.

## 2021-03-04 NOTE — ED PROVIDER NOTE - NS ED ROS FT
Gen: No fever, No chills  Eyes: +blurry vision  ENT: No congestion, sore throat  Resp: No cough. No SOB  Cardiovascular: No chest pain. No palpitations  GI: No abdominal pain, nausea/vomiting, diarrhea, constipation  :  No change in urine output or frequency; no dysuria  MS: No joint. No muscle pain  Skin: No rashes  Neuro: + headache; No numbness or weakness  Remainder negative, except as per the HPI

## 2021-03-04 NOTE — ED PROVIDER NOTE - ATTENDING CONTRIBUTION TO CARE
MD Brown:  patient seen and evaluated personally.   I agree with the History & Physical,  Impression & Plan other than what was detailed in my note.  MD Brown  46 yo f presenting w/ one week of ha, feels like prior but prolonged, no red flag symptoms not max in onset, gradual, no visual changes/loss, no neck pain, fevers rash. afebrile vitals stable  non toxic well appearing, NC/AT, heart sounds, conjunctiva non conjected, sclera anicteric, moist mucous membranes, neck supple,  normal, no mrg, lungs cta b/l no wrr, abd soft non distended w/ no tenderness, no visual deformities of extremities, axox4, , normal mood and affect. Plan for symptomatic trx, labs, ct head to screen for mass. re evaluate, likely dc home

## 2021-03-04 NOTE — ED PROVIDER NOTE - PATIENT PORTAL LINK FT
You can access the FollowMyHealth Patient Portal offered by [facility] by registering at the following website: [email or web address]. By joining [facility]’s FollowMyHealth portal, you will also be able to view your health information using other applications (apps) compatible with our system.

## 2021-03-04 NOTE — DOWNTIME INTERRUPTION NOTE - WHICH MANUAL FORMS INITIATED?
ED downtime continuation forms were used. Initial nurse noted on paper as well as vs.
Emergency continuation downtime charting used. VS charted.

## 2021-03-04 NOTE — ED PROVIDER NOTE - NSFOLLOWUPINSTRUCTIONS_ED_ALL_ED_FT
Follow up with your neurologist this week.    For pain:  Take Ibuprofen 400-600mg every 4-6 hours as needed for pain, do not exceed 3,200mg per day     Take Tylenol 650mg every 4-6 hours as needed for pain, do not exceed 3,250mg per day    Seek immediate medical care for new or worsening symptoms.

## 2021-03-04 NOTE — ED ADULT NURSE NOTE - CHIEF COMPLAINT QUOTE
Quick DC. Duplicate Request  Refill Authorization Note     is requesting a refill authorization.    Brief assessment and rationale for refill: QUICK DC: duplicate request   Name and strength of medication: FLUoxetine 20 MG capsule            Medication reconciliation completed: No                         Comments:   Ordering Encounter Report     Associated Reports   View Encounter                       headache x 1 week; had blurry vision at 12 noon yesterday (WED) and numbness left arm yesterday (WED)  at 3pm; blurry vision and numbness resolved; headache continues; took Tylenol and oxycodone at home; stroke eval at charge desk

## 2021-03-04 NOTE — ED ADULT TRIAGE NOTE - CHIEF COMPLAINT QUOTE
headache x 1 week; had blurry vision at 12 noon yesterday (WED) and numbness left arm yesterday (WED)  at 3pm; blurry vision and numbness resolved; headache continues; took Tylenol and oxycodone at home; stroke eval at charge desk

## 2021-03-04 NOTE — ED PROVIDER NOTE - CLINICAL SUMMARY MEDICAL DECISION MAKING FREE TEXT BOX
45y female with a headache. Symptoms similar to her chronic symptoms. Not concerning for mass, ich. More concerning for tension headache and cervical rediculopathy. Will get labs, ct head and neck, treat headache and reassess.

## 2021-03-04 NOTE — ED PROVIDER NOTE - PHYSICAL EXAMINATION
Gen: AAOx3, non-toxic  Head: NCAT  HEENT: oral mucosa moist, normal conjunctiva  Lung: CTAB, no respiratory distress, speaking in full sentences  CV: RRR, no murmurs, rubs or gallops  Abd: soft, NTND, no guarding  MSK: no visible deformities  Neuro: No focal sensory or motor deficits, cn 2-12 intact, normal finger to nose, no focal weakness  Skin: Warm, well perfused, no rash  Psych: normal affect.   ~Sujit Mili PGY3

## 2021-03-04 NOTE — DOWNTIME INTERRUPTION NOTE - TIME SYSTEM UNAVAILABLE
Spiritual Care Note    Patient Information     Patient's Name: Reyes Amezcua   MRN: [id number]    YOB: 1954   Age and Gender: 65 y.o. male   Service Area: [facility]   Room (and Bed): [place] [address]   Ethnicity or Nationality:     Primary Language: English   Muslim/Spiritual preference: Baptist   Place of Residence: [place]   Family/Friends/Others Present: Yes (phoned wife)   Clinical Team Present: No   Medical Diagnosis(-es)/Procedure(s): Trauma   Code Status: Full Code    Date of Admission: 5/9/2020   Length of Stay: 18 days      Spiritual Care Provider Information:  Name of Spiritual Care Provider: Clare Leos  Title of Spiritual Care Provider: Associate   Phone Number: [phone]  E-mail: [email or web address]  Total time : 10 minutes    Spiritual Screen Results:    Gen Nursing  Spiritual Screen  Is your spiritual health or inner well-being important to you as you cope with your medical condition?: Yes  Would you like to receive a visit from our Spiritual Care team or your own Denominational or spiritual leader?: Yes  Was spiritual care education provided to the patient?: Yes  Cultural/Spiritual Needs During Care: Clinical  Sources of Hope/Gisele: Counseling/Support groups, Companionship     Palliative Care  PC Muslim/Spiritual Screening  Was spiritual care education provided to the patient?: Yes      Encounter/Request Information  Encounter/Request Type   Visited With: Patient, Family  Nature of the Visit: Follow-up, On shift  Continue Visiting: Yes  Next Follow-up Date: 05/14/20  Crisis Visit: Trauma  Referral From/ Origin of Request: Verbal family  Referral To: Other /SCP    Religous Needs/Values  Muslim Needs Visit  Muslim Needs: Prayer  Ritual Needs Visit  Ritual Needs: [place]    Spiritual Assessment     Spiritual Care Encounters    Observations/Symptoms: Accepting    Interaction/Conversation:  prayed with pt and called wife to check in. Kerry report that  04-Mar-2021 03:00 "she is \"up and down\" but is hopeful about pt being transferred to rehab. She thanked the  for calling.    Assessment: Need    Need: Seeking Spiritual Assistance and Support    Interventions: Compassionate presence, prayer.    Plan: Daily Visits    Notes:            "

## 2021-03-12 LAB — GLUCOSE BLDC GLUCOMTR-MCNC: 117 MG/DL — HIGH (ref 70–99)

## 2021-03-30 ENCOUNTER — APPOINTMENT (OUTPATIENT)
Dept: PHYSICAL MEDICINE AND REHAB | Facility: CLINIC | Age: 46
End: 2021-03-30

## 2021-03-31 ENCOUNTER — APPOINTMENT (OUTPATIENT)
Dept: PHYSICAL MEDICINE AND REHAB | Facility: CLINIC | Age: 46
End: 2021-03-31
Payer: OTHER MISCELLANEOUS

## 2021-03-31 VITALS
OXYGEN SATURATION: 97 % | SYSTOLIC BLOOD PRESSURE: 132 MMHG | DIASTOLIC BLOOD PRESSURE: 84 MMHG | TEMPERATURE: 97.8 F | HEART RATE: 91 BPM

## 2021-03-31 PROCEDURE — 99072 ADDL SUPL MATRL&STAF TM PHE: CPT

## 2021-03-31 PROCEDURE — 99214 OFFICE O/P EST MOD 30 MIN: CPT

## 2021-04-03 NOTE — ASSESSMENT
[FreeTextEntry1] : 45 yo F who presents for follow up with low back pain with radiation into bilateral lower extremities consistent with lumbar radiculopathy and neck pain likely secondary to cervical radiculopathy and cervical facet syndrome/cervicalgia. \par \par Patient reports continued and progressive neck pain despite greater than six weeks of physician directed conservative treatment including PT/HEP, PO NSAIDs, PO muscle relaxants, relative rest, trigger point injections, cervical MBB, and cervical epidural steroid injection.\par \par -MRI cervical spine w/o contrast ordered\par -Continue PT/HEP.\par -Start gabapentin 300 mg PO qHS dispense 90 tablets.  Patient denies a history of chronic kidney disease.\par -RTC following MRI to review results\par \par Alexandre Boyle MD\par Spine and Sports Medicine\par \par [employer]\par At [facility]\par \par

## 2021-04-07 ENCOUNTER — FORM ENCOUNTER (OUTPATIENT)
Age: 46
End: 2021-04-07

## 2021-04-18 ENCOUNTER — FORM ENCOUNTER (OUTPATIENT)
Age: 46
End: 2021-04-18

## 2021-05-20 ENCOUNTER — OUTPATIENT (OUTPATIENT)
Dept: OUTPATIENT SERVICES | Facility: HOSPITAL | Age: 46
LOS: 1 days | End: 2021-05-20
Payer: COMMERCIAL

## 2021-05-20 ENCOUNTER — APPOINTMENT (OUTPATIENT)
Dept: MRI IMAGING | Facility: CLINIC | Age: 46
End: 2021-05-20
Payer: OTHER MISCELLANEOUS

## 2021-05-20 DIAGNOSIS — M54.12 RADICULOPATHY, CERVICAL REGION: ICD-10-CM

## 2021-05-20 PROCEDURE — 72141 MRI NECK SPINE W/O DYE: CPT | Mod: 26

## 2021-05-20 PROCEDURE — 72141 MRI NECK SPINE W/O DYE: CPT

## 2021-06-08 ENCOUNTER — APPOINTMENT (OUTPATIENT)
Dept: PHYSICAL MEDICINE AND REHAB | Facility: CLINIC | Age: 46
End: 2021-06-08

## 2021-06-11 ENCOUNTER — APPOINTMENT (OUTPATIENT)
Dept: PHYSICAL MEDICINE AND REHAB | Facility: CLINIC | Age: 46
End: 2021-06-11
Payer: OTHER MISCELLANEOUS

## 2021-06-11 PROCEDURE — 99072 ADDL SUPL MATRL&STAF TM PHE: CPT

## 2021-06-11 PROCEDURE — 20611 DRAIN/INJ JOINT/BURSA W/US: CPT | Mod: LT

## 2021-06-11 PROCEDURE — 99214 OFFICE O/P EST MOD 30 MIN: CPT | Mod: 25

## 2021-06-13 NOTE — PROCEDURE
[de-identified] : Procedure: Ultrasound Guided Subacromial Bursa Steroid Injection:\par \par Shoulder- LEFT\par -Potential benefits and side effects of the procedure were explained to the patient and an opportunity for questions was provided.  In addition to typical procedure related side effects, specific possible side effects from the procedure were explained including injury to the nerves, vessels/branches, and skin depigmentation/ subcutaneous atrophy from corticosteroid.\par \par -Patient positioning: seated\par \par -Skin Preparation: the overlying skin was prepped with Chloro-prep swab sticks which was allowed to dry for at least 30 seconds.\par -Visualization of the subacromial bursa region anatomy was performed with a high frequency ultrasound probe sterilized with PDI wipe prior to application of sterile ultrasound gel.\par -Under direct ultrasound, the subacromial bursa was visualized in long and short axis.\par -The skin at the needle entry point was anesthetized with 1 mL of 0.5% lidocaine using a 27 gauge 1.5 inch needle.\par -Then a 25 gauge 2.5 inch needle was then advanced into the subacromial bursa using the following solution:\par -3.5 ml volume (3 mL of 0.5% lidocaine and 0.5 ml of kenalog 40)\par -Total volume injected in subacromial bursa: 3.5 ml\par -Needle position was monitored using both long-axis and short-axis visualization and adjusted as necessary and aspiration prior to injections were negative for blood return.\par -A Band-Aid was then placed over the needle entry site.\par \par Procedure summary:\par -Patient tolerance: Excellent\par -Miscellaneous technical comments: none\par \par Recommendations:\par -Ice the area for 20-30 minutes up to [id number] until being seen for follow-up\par -Limit use of the affected region.\par -continue with other aspects of treatment plan as described in prior office note.\par -Contact me with any questions/concerns.\par

## 2021-06-13 NOTE — PHYSICAL EXAM
[FreeTextEntry1] : Gen: NAD, sitting\par Neck: supple, ROM near full but continues to be limited by pain, tenderness lower right cervical facet joints, pain with facet loading, neg spurling\par Right shoulder: neg neer's, neg hawkin's\par Left shoulder: range of motion limited in all planes secondary to pain, pos neer's, pos hawkin's, pos speed's, neg apprehension, neg cross arm, neg empty can \par CV: no cyanosis\par Pulm: breathing well on room air\par Abd: soft\par Low back: range of motion limited by pain, tenderness to palpation lower lumbar paraspinals and bilateral sciatic notch improved from prior exam, +muscle spasm, neg straight leg raise bilaterally, neg FABERE, neg FAIR\par Msk: \par 5/5 hip flexion B/L, 5/5 knee extension B/L, 5/5 knee flexion B/L, 5/5 dorsiflexion B/L, 5/5 plantar flexion B/L\par 5/5 shoulder abduction B/L, 5/5 elbow flexion B/L, 5/5 elbow extension B/L, 5/5 wrist extension B/L, 5/5 hand  B/L\par Neuro: sensation intact to light touch in bilateral upper and lower extremities, reflexes 2+ brachioradialis, biceps, triceps bilaterally, reflexes 2+ patella, medial hamstring, achilles bilaterally, negative babinski, negative currie\par \par

## 2021-06-13 NOTE — HISTORY OF PRESENT ILLNESS
[FreeTextEntry1] : 6/11/21\par 43 yo F who presents for follow up with low back, neck, and left shoulder pain.  Patient reports that pain has not improved despite compliance with PT/HEP and gabapentin.  MRI cervical spine w/o contrast reviewed showing mild/moderate cervical spondylosis but with no interval change.  Patient reports pain is localized to anterior and lateral shoulder and worse with overhead activities and shoulder activities.  Patient denies new weakness, numbness or paresthesia.  Denies bowel/bladder dysfunction, fevers, chills, weight loss, night pain, or night sweats.\par \par 3/31/21\par 43 yo F who presents for follow up with low back and neck pain.  Patient had undergone cervical MBB with Dr. Hernandez with only temporary improvement in her pain.  Previous to this, patient had undergone cervical ILESI with only minimal improvement. Patient reports that neck pain has recently flared despite compliance with PT/HEP and PO NSAIDs.  Patient denies new weakness, numbness or paresthesia.  Denies bowel/bladder dysfunction, fevers, chills, weight loss, night pain, or night sweats.\par \par 6/26/20\par 43 yo F who presents for follow up with low back and neck pain.  Patient reports that pain has been is about the same as last visit despite PT/HEP, mobic, cyclobenzaprine.  Patient reports that pain is present in lower cervical spine and worse with neck extension.  Previously, pain had radiated into right upper extremities but radicular symptoms have resolved following right C7-T1 interlaminar epidural injection on 1/17/20.  Patient reports pain is about 6-7/10.  Patient tolerating light duty at work but has been unable to progress to full duty due to her pain.  Patient denies new weakness, numbness or paresthesia.  Denies bowel/bladder dysfunction, fevers, chills, weight loss, night pain, or night sweats.\par \par 6/5/20\par 43 yo F who presents for follow up with low back and neck pain.  Patient reports that pain has improved markedly from her last appointment with further sessions of PT.  Patient reports taking mobic and cyclobenzaprine sporadically for pain with adequate relief.  Despite being on light duty, patient reports that she was named employee of the month and she feels quite comfortable in her new role at work.  Patient denies new weakness, numbness or paresthesia.  Denies bowel/bladder dysfunction, fevers, chills, weight loss, night pain, or night sweats.\par \par 5/15/20\par 43 yo F who presents for follow up with low back and neck pain.  Patient reports that pain had been worse earlier this month but she reports that the demands at her work was very intense given the covid-19 outbreak.  Patient also reports that, due to work demands, she was only able to attend 1 PT session per week.  Patient continues to take cyclobenzaprine with significant improvement in her pain.  Patient reports that her work demands have steadily decreased as covid-19 improves and her pain has been significantly improved.  Pain now mild to moderate in intensity.  Patient denies new weakness, numbness or paresthesia.  Denies bowel/bladder dysfunction, fevers, chills, weight loss, night pain, or night sweats.\par \par 4/17/20\par 43 yo F who presents for follow up with low back and neck pain.  Patient reports that she has started light duty at work with periodic worsening of pain especially at the end of her shift.  Patient reports that this symptom has improved with transitioning to day shift.  Patient reports that she continues to participated in PT/HEP with significant improvement.  Patient takes occasional mobic for pain relief.  Patient denies new weakness, numbness or paresthesia.  Denies bowel/bladder dysfunction, fevers, chills, weight loss, night pain, or night sweats.\par \par 2/26/20\par 43 yo F who presents for follow up with low back and neck pain.  Patient reports almost complete resolution of her neck pain since her last appointment.  Patient continues to participate in PT and HEP and is continuing to see progress in pain.  Patient denies new weakness, numbness or paresthesia.  Denies bowel/bladder dysfunction, fevers, chills, weight loss, night pain, or night sweats.\par \par 2/5/20\par 43 yo F who presents for follow up with low back and neck pain.  Patient reports that pain has improved significantly following right C7-T1 ILESI on 1/17/20.  Patient reports that pain is mild and is now 3/10 in severity.  Patient reports no PT/HEP since her last injection.  Patient denies new weakness, numbness or paresthesia.  Denies bowel/bladder dysfunction, fevers, chills, weight loss, night pain, or night sweats.\par \par 1/13/20\par 43 yo F who presents for follow up with low back and neck pain.  Patient reports that she continues to have neck pain that radiates into the right upper extremity.  Neck pain has significantly improved since her last visit but is still moderate to severe in intensity.  Patient continues to take cyclobenzaprine with significant improvement in her pain.  Patient denies new weakness, numbness or paresthesia.  Denies bowel/bladder dysfunction, fevers, chills, weight loss, night pain, or night sweats.\par \par 12/2/19\par 43 yo F who presents for follow up with low back and neck pain.  Patient reports that low back pain is near complete resolution.  However, patient continues to complain of neck pain with radiation right upper extremity.  Pain is worse with neck movements and improves with rest.  Pain has been refractory to conservative care with PT/HEP, mobic, and PO corticosteroids.  Patient denies previous injections.  Denies new weakness, numbness or paresthesia.  Denies bowel/bladder dysfunction, fevers, chills, weight loss, night pain, or night sweats.\par \par \par 11/11/19\par 44 year old F who presents for follow up with low back and neck pain.  In the interim since her last visit, patient reports that she has participated in PT and HEP and completed the prescribe course of mobic with significant improvement in her pain.  Pain in the low back has now almost completely resolved.  However, patient complains that she has neck pain that started after her injury on 9/25/19.  Patient reports that this pain is improving but persists and is now 5/10 in severity.  Pain localized to right lower cervical spine without radiation.  Worse with neck movements and neck extension.  Improved with PT and rest.   Patient has also been compliant with recommendations for medrol dose pack.  Patient denies new trauma or falls.   Patient denies new weakness, numbness or paresthesia.  Patient denies bowel/bladder dysfunction, fevers, chills, weight loss, night pain, or night sweats.\par

## 2021-06-13 NOTE — ASSESSMENT
[FreeTextEntry1] : 45 yo F who presents for follow up with low back pain with radiation into bilateral lower extremities consistent with lumbar radiculopathy and neck pain likely secondary to cervical radiculopathy and cervical facet syndrome/cervicalgia.   Left shoulder pain consistent with subacromial bursitis and rotator cuff impingement.\par \par -MRI cervical spine w/o contrast reviewed\par -US guided left subacromial bursa injection performed on this visit with significant improvement in her pain.\par -Continue PT/HEP.\par -Cont. gabapentin 300 mg PO qHS dispense 90 tablets.  Patient denies a history of chronic kidney disease.\par -Start medrol dose pack, dispense 1 pack.  Patient warned to avoid use of PO NSAIDS while on oral steroids.  \par -Start cyclobenzaprine 10 mg tablets, 1 tablets PO qHS PRN pain, dispense 30.  Patient denies a history of CHF, liver dysfunction or arrhythmias.   Patient warned about the sedative nature of this medication and recommended not to drive or to handle heavy machinery.\par -RTC 2-3 weeks\par \par Alexandre Boyle MD\par Spine and Sports Medicine\par \par [employer]\par At hospitals/[facility]\par \par

## 2021-06-25 ENCOUNTER — APPOINTMENT (OUTPATIENT)
Dept: PHYSICAL MEDICINE AND REHAB | Facility: CLINIC | Age: 46
End: 2021-06-25
Payer: OTHER MISCELLANEOUS

## 2021-06-25 VITALS
OXYGEN SATURATION: 100 % | SYSTOLIC BLOOD PRESSURE: 132 MMHG | TEMPERATURE: 95.4 F | HEART RATE: 82 BPM | DIASTOLIC BLOOD PRESSURE: 86 MMHG

## 2021-06-25 DIAGNOSIS — M75.52 BURSITIS OF LEFT SHOULDER: ICD-10-CM

## 2021-06-25 DIAGNOSIS — M75.42 IMPINGEMENT SYNDROME OF LEFT SHOULDER: ICD-10-CM

## 2021-06-25 PROCEDURE — 20550 NJX 1 TENDON SHEATH/LIGAMENT: CPT | Mod: LT

## 2021-06-25 PROCEDURE — 99214 OFFICE O/P EST MOD 30 MIN: CPT | Mod: 25

## 2021-06-25 PROCEDURE — 99072 ADDL SUPL MATRL&STAF TM PHE: CPT

## 2021-06-25 NOTE — PHYSICAL EXAM
[FreeTextEntry1] : Gen: NAD, sitting\par Neck: supple, ROM near full but continues to be limited by pain, tenderness lower right cervical facet joints, pain with facet loading, neg spurling\par Right shoulder: neg neer's, neg hawkin's\par Left shoulder: range of motion limited in all planes secondary to pain, pos neer's, pos hawkin's, pos speed's, neg apprehension, neg cross arm, neg empty can \par Left elbow: FAROM, tenderness to insertion of lateral epicondyle, pos cozen's, pos mills\par CV: no cyanosis\par Pulm: breathing well on room air\par Abd: soft\par Low back: range of motion limited by pain, tenderness to palpation lower lumbar paraspinals and bilateral sciatic notch improved from prior exam, +muscle spasm, neg straight leg raise bilaterally, neg FABERE, neg FAIR\par Msk: \par 5/5 hip flexion B/L, 5/5 knee extension B/L, 5/5 knee flexion B/L, 5/5 dorsiflexion B/L, 5/5 plantar flexion B/L\par 5/5 shoulder abduction B/L, 5/5 elbow flexion B/L, 5/5 elbow extension B/L, 5/5 wrist extension B/L, 5/5 hand  B/L\par Neuro: sensation intact to light touch in bilateral upper and lower extremities, reflexes 2+ brachioradialis, biceps, triceps bilaterally, reflexes 2+ patella, medial hamstring, achilles bilaterally, negative babinski, negative currie\par \par

## 2021-06-25 NOTE — PROCEDURE
[de-identified] : Lateral epicondylitis injection\par \par Procedure: Ultrasound Guided Lateral Epicondylitis Injection:\par \par Elbow - LEFT\par \par -Informed consent was obtained after the potential benefits and side effects of the procedure were explained to the patient, and after opportunity for questions was provided.  In addition to typical procedure-related side effects, specific possible side effects from the procedure were explained including injury to the ulnar nerves/branches, injury to the ulnar vessels/branches, skin depigmentation/subcutaneous atrophy from corticosteroid, and injury of the extensor tendons.\par -Patient positioning: Seated/supine\par -Skin Preparation: The overlying skin was prepped with chlorhexidine which was allowed to dry for at least 30 seconds.\par -Visualization of the lateral elbow anatomy was performed with a high frequency ultrasound probe sterilized with chlorhexidine and PDI wipe prior to application of sterile ultrasound gel.  \par -Under direct ultrasound visualization, using the following approach- A 27 gauge 1.5 inch needles attached to a 5 mL syringe, the site of injection was anesthetized using 0.5% lidocaine and guided by ultrasound to the target, the surround origin of the extensor tendons of the forearm.  Then, a 22 gauge 1.5 inch needle attached to a 5 mL syringe by connection tube with the injectate below was guided by US to the target tissue sited above.  Once proper needle placement was confirmed in both long and short axis, the injectate below was deposited under US guidance.  The needle was removed and subsequently a band-aide was placed.\par -3 mL volume (2.75 mL of 0.5% lidocaine and 0.25 mL of Kenalog\par -Total volume injected: 3 mL\par -Needle position was monitored using both long-axis and short-axis visualization and adjusted as necessary so that paresthesias were not reported during needle placement and aspiration prior to injections were negative for blood return.\par -A Band-Aid was then placed over the needle entry site.\par \par Procedure summary:\par -Patient tolerance: Excellent\par -Misc. technical comments: None\par \par Recommendations:\par -Ice the area for 20-30 minutes up to [id number] prn until seen for follow-up\par -Limit use of the affected wrist via immobilization in the wrist splint.\par -Continue with other aspects of treatment plan as described in prior office note.\par -Contact me with any questions or concerns.\par

## 2021-06-25 NOTE — HISTORY OF PRESENT ILLNESS
[FreeTextEntry1] : 6/25/21\par 45 yo F who presents for follow up with low back, neck and left shoulder pain.  Patient is s/p US guided left subacromial bursa injection on previous visit with significant improvement in her pain.  Patient reports that the left shoulder pain has since resolved.  Patient now has left lateral elbow pain.  Pain is worse with wrist extension.  Patient denies new weakness, numbness or paresthesia.  Denies bowel/bladder dysfunction, fevers, chills, weight loss, night pain, or night sweats.\par \par 6/11/21\par 45 yo F who presents for follow up with low back, neck, and left shoulder pain.  Patient reports that pain has not improved despite compliance with PT/HEP and gabapentin.  MRI cervical spine w/o contrast reviewed showing mild/moderate cervical spondylosis but with no interval change.  Patient reports pain is localized to anterior and lateral shoulder and worse with overhead activities and shoulder activities.  Patient denies new weakness, numbness or paresthesia.  Denies bowel/bladder dysfunction, fevers, chills, weight loss, night pain, or night sweats.\par \par 3/31/21\par 45 yo F who presents for follow up with low back and neck pain.  Patient had undergone cervical MBB with Dr. Hernandez with only temporary improvement in her pain.  Previous to this, patient had undergone cervical ILESI with only minimal improvement. Patient reports that neck pain has recently flared despite compliance with PT/HEP and PO NSAIDs.  Patient denies new weakness, numbness or paresthesia.  Denies bowel/bladder dysfunction, fevers, chills, weight loss, night pain, or night sweats.\par \par 6/26/20\par 45 yo F who presents for follow up with low back and neck pain.  Patient reports that pain has been is about the same as last visit despite PT/HEP, mobic, cyclobenzaprine.  Patient reports that pain is present in lower cervical spine and worse with neck extension.  Previously, pain had radiated into right upper extremities but radicular symptoms have resolved following right C7-T1 interlaminar epidural injection on 1/17/20.  Patient reports pain is about 6-7/10.  Patient tolerating light duty at work but has been unable to progress to full duty due to her pain.  Patient denies new weakness, numbness or paresthesia.  Denies bowel/bladder dysfunction, fevers, chills, weight loss, night pain, or night sweats.\par \par 6/5/20\par 45 yo F who presents for follow up with low back and neck pain.  Patient reports that pain has improved markedly from her last appointment with further sessions of PT.  Patient reports taking mobic and cyclobenzaprine sporadically for pain with adequate relief.  Despite being on light duty, patient reports that she was named employee of the month and she feels quite comfortable in her new role at work.  Patient denies new weakness, numbness or paresthesia.  Denies bowel/bladder dysfunction, fevers, chills, weight loss, night pain, or night sweats.\par \par 5/15/20\par 45 yo F who presents for follow up with low back and neck pain.  Patient reports that pain had been worse earlier this month but she reports that the demands at her work was very intense given the covid-19 outbreak.  Patient also reports that, due to work demands, she was only able to attend 1 PT session per week.  Patient continues to take cyclobenzaprine with significant improvement in her pain.  Patient reports that her work demands have steadily decreased as covid-19 improves and her pain has been significantly improved.  Pain now mild to moderate in intensity.  Patient denies new weakness, numbness or paresthesia.  Denies bowel/bladder dysfunction, fevers, chills, weight loss, night pain, or night sweats.\par \par 4/17/20\par 45 yo F who presents for follow up with low back and neck pain.  Patient reports that she has started light duty at work with periodic worsening of pain especially at the end of her shift.  Patient reports that this symptom has improved with transitioning to day shift.  Patient reports that she continues to participated in PT/HEP with significant improvement.  Patient takes occasional mobic for pain relief.  Patient denies new weakness, numbness or paresthesia.  Denies bowel/bladder dysfunction, fevers, chills, weight loss, night pain, or night sweats.\par \par 2/26/20\par 45 yo F who presents for follow up with low back and neck pain.  Patient reports almost complete resolution of her neck pain since her last appointment.  Patient continues to participate in PT and HEP and is continuing to see progress in pain.  Patient denies new weakness, numbness or paresthesia.  Denies bowel/bladder dysfunction, fevers, chills, weight loss, night pain, or night sweats.\par \par 2/5/20\par 45 yo F who presents for follow up with low back and neck pain.  Patient reports that pain has improved significantly following right C7-T1 ILESI on 1/17/20.  Patient reports that pain is mild and is now 3/10 in severity.  Patient reports no PT/HEP since her last injection.  Patient denies new weakness, numbness or paresthesia.  Denies bowel/bladder dysfunction, fevers, chills, weight loss, night pain, or night sweats.\par \par 1/13/20\par 45 yo F who presents for follow up with low back and neck pain.  Patient reports that she continues to have neck pain that radiates into the right upper extremity.  Neck pain has significantly improved since her last visit but is still moderate to severe in intensity.  Patient continues to take cyclobenzaprine with significant improvement in her pain.  Patient denies new weakness, numbness or paresthesia.  Denies bowel/bladder dysfunction, fevers, chills, weight loss, night pain, or night sweats.\par \par 12/2/19\par 45 yo F who presents for follow up with low back and neck pain.  Patient reports that low back pain is near complete resolution.  However, patient continues to complain of neck pain with radiation right upper extremity.  Pain is worse with neck movements and improves with rest.  Pain has been refractory to conservative care with PT/HEP, mobic, and PO corticosteroids.  Patient denies previous injections.  Denies new weakness, numbness or paresthesia.  Denies bowel/bladder dysfunction, fevers, chills, weight loss, night pain, or night sweats.\par \par \par 11/11/19\par 44 year old F who presents for follow up with low back and neck pain.  In the interim since her last visit, patient reports that she has participated in PT and HEP and completed the prescribe course of mobic with significant improvement in her pain.  Pain in the low back has now almost completely resolved.  However, patient complains that she has neck pain that started after her injury on 9/25/19.  Patient reports that this pain is improving but persists and is now 5/10 in severity.  Pain localized to right lower cervical spine without radiation.  Worse with neck movements and neck extension.  Improved with PT and rest.   Patient has also been compliant with recommendations for medrol dose pack.  Patient denies new trauma or falls.   Patient denies new weakness, numbness or paresthesia.  Patient denies bowel/bladder dysfunction, fevers, chills, weight loss, night pain, or night sweats.\par

## 2021-06-25 NOTE — ASSESSMENT
[FreeTextEntry1] : 45 yo F who presents for follow up with \par 1) low back pain with radiation into bilateral lower extremities consistent with lumbar radiculopathy \par 2) neck pain likely secondary to cervical radiculopathy and cervical facet syndrome/cervicalgia.   \par 3) Left shoulder pain consistent with subacromial bursitis and rotator cuff impingement.\par 4) left lateral elbow pain consistent with lateral epicondylitis\par \par Left shoulder pain almost completely resolved following US guided subacromial bursa injection.\par \par -MRI cervical spine w/o contrast reviewed\par -US guided left lateral epicondyle injection performed on this visit with significant improvement in her pain.  Ice and tylenol prn pain\par -Continue PT/HEP.\par -Cont. gabapentin 300 mg PO qHS dispense 90 tablets.  Patient denies a history of chronic kidney disease..  \par -Cont. cyclobenzaprine 10 mg tablets, 1 tablets PO qHS PRN pain, dispense 30.  Patient denies a history of CHF, liver dysfunction or arrhythmias.   Patient warned about the sedative nature of this medication and recommended not to drive or to handle heavy machinery.\par -RTC 2-3 weeks\par \par Alexandre Boyle MD\par Spine and Sports Medicine\par \par [employer]\par At hospitals/[facility]\par \par

## 2021-07-07 ENCOUNTER — APPOINTMENT (OUTPATIENT)
Dept: PHYSICAL MEDICINE AND REHAB | Facility: CLINIC | Age: 46
End: 2021-07-07
Payer: OTHER MISCELLANEOUS

## 2021-07-07 VITALS — TEMPERATURE: 97.4 F

## 2021-07-07 DIAGNOSIS — M47.812 SPONDYLOSIS W/OUT MYELOPATHY OR RADICULOPATHY, CERVICAL REGION: ICD-10-CM

## 2021-07-07 DIAGNOSIS — M54.2 CERVICALGIA: ICD-10-CM

## 2021-07-07 DIAGNOSIS — M77.12 LATERAL EPICONDYLITIS, LEFT ELBOW: ICD-10-CM

## 2021-07-07 PROCEDURE — 99072 ADDL SUPL MATRL&STAF TM PHE: CPT

## 2021-07-07 PROCEDURE — 99214 OFFICE O/P EST MOD 30 MIN: CPT

## 2021-07-08 PROBLEM — M54.2 CERVICALGIA: Status: ACTIVE | Noted: 2019-11-11

## 2021-07-08 PROBLEM — M47.812 CERVICAL SPONDYLOSIS: Status: ACTIVE | Noted: 2020-07-01

## 2021-07-08 PROBLEM — M77.12 LATERAL EPICONDYLITIS OF LEFT ELBOW: Status: ACTIVE | Noted: 2021-06-25

## 2021-07-09 NOTE — ASSESSMENT
[FreeTextEntry1] : 45 yo F who presents for follow up with \par 1) low back pain with radiation into bilateral lower extremities consistent with lumbar radiculopathy \par 2) neck pain likely secondary to cervical radiculopathy and cervical facet syndrome/cervicalgia.   \par 3) Left shoulder pain consistent with subacromial bursitis and rotator cuff impingement.\par 4) left lateral elbow pain consistent with lateral epicondylitis\par \par Left elbow pain almost completely resolved following US guided left lateral epicondyle injection.\par \par Patient reports significant and severe neck pain for several months.  Pain has been refractory to conservative care including PT/HEP, acupuncture, PO gabapentin, PO cyclobenzaprine, and cervical spinal interventions (cervical MARCIA and cervical MBB).  Patient reports having significant pain following previous spine injections and minimal improvement.  Patient reports that the pain is severe (7-9/10) and significantly diminishes her capacity to perform her ADL's (upper body dressing, bathing).  I will refer patient to Orthopedic Spine surgery to consider candidacy for a surgical intervention.\par \par -MRI cervical spine w/o contrast reviewed\par -Patient provided with a referral for Orthopedic Spine Surgery to consider candidacy for surgical intervention.\par -Continue PT/HEP.\par -Start medrol dose pack, dispense 1 pack.  Patient warned to avoid use of PO NSAIDS while on oral steroids.  \par -Cont. gabapentin 300 mg PO qHS dispense 90 tablets.  Patient denies a history of chronic kidney disease..  \par -Cont. cyclobenzaprine 10 mg tablets, 1 tablets PO qHS PRN pain, dispense 30.  Patient denies a history of CHF, liver dysfunction or arrhythmias.   Patient warned about the sedative nature of this medication and recommended not to drive or to handle heavy machinery.\par -RTC following surgical consultation.\par \par Alexandre Boyle MD\par Spine and Sports Medicine\par \par [employer]\par At [facility]\par \par

## 2021-07-09 NOTE — PHYSICAL EXAM
[FreeTextEntry1] : Gen: NAD, sitting\par Neck: supple, ROM near full but continues to be limited by pain, tenderness lower right cervical paraspinals, pain with facet loading, neg spurling\par Right shoulder: neg neer's, neg hawkin's\par Left shoulder: range of motion limited in all planes secondary to pain, pos neer's, pos hawkin's, pos speed's, neg apprehension, neg cross arm, neg empty can \par Left elbow: FAROM, no tenderness to insertion of lateral epicondyle, neg cozen's, neg mills\par CV: no cyanosis\par Pulm: breathing well on room air\par Abd: soft\par Low back: range of motion limited by pain, tenderness to palpation lower lumbar paraspinals and bilateral sciatic notch improved from prior exam, +muscle spasm, neg straight leg raise bilaterally, neg FABERE, neg FAIR\par Msk: \par 5/5 hip flexion B/L, 5/5 knee extension B/L, 5/5 knee flexion B/L, 5/5 dorsiflexion B/L, 5/5 plantar flexion B/L\par 5/5 shoulder abduction B/L, 5/5 elbow flexion B/L, 5/5 elbow extension B/L, 5/5 wrist extension B/L, 5/5 hand  B/L\par Neuro: sensation intact to light touch in bilateral upper and lower extremities, reflexes 2+ brachioradialis, biceps, triceps bilaterally, reflexes 2+ patella, medial hamstring, achilles bilaterally, negative babinski, negative currie\par \par

## 2021-07-09 NOTE — HISTORY OF PRESENT ILLNESS
[FreeTextEntry1] : 7/7/21\par 46 yo F who presents for follow up with low back, neck, left shoulder and left elbow pain.  Patient s/p US guided left lateral epicondyle injection on 6/25/21 with significant improvement in her left elbow pain.  Patient however reports significant and persistent neck pain.  Patient underwent cervical MBB and cervical MARCIA (one injection by Dr. Hernandez and one by me) both of which led to minimal pain relief and she reports having significant pain following both of these injections.  Patient denies new weakness, numbness or paresthesia.  Denies bowel/bladder dysfunction, fevers, chills, weight loss, night pain, or night sweats.\par \par 6/25/21\par 45 yo F who presents for follow up with low back, neck and left shoulder pain.  Patient is s/p US guided left subacromial bursa injection on previous visit with significant improvement in her pain.  Patient reports that the left shoulder pain has since resolved.  Patient now has left lateral elbow pain.  Pain is worse with wrist extension.  Patient denies new weakness, numbness or paresthesia.  Denies bowel/bladder dysfunction, fevers, chills, weight loss, night pain, or night sweats.\par \par 6/11/21\par 45 yo F who presents for follow up with low back, neck, and left shoulder pain.  Patient reports that pain has not improved despite compliance with PT/HEP and gabapentin.  MRI cervical spine w/o contrast reviewed showing mild/moderate cervical spondylosis but with no interval change.  Patient reports pain is localized to anterior and lateral shoulder and worse with overhead activities and shoulder activities.  Patient denies new weakness, numbness or paresthesia.  Denies bowel/bladder dysfunction, fevers, chills, weight loss, night pain, or night sweats.\par \par 3/31/21\par 45 yo F who presents for follow up with low back and neck pain.  Patient had undergone cervical MBB with Dr. Hernandez with only temporary improvement in her pain.  Previous to this, patient had undergone cervical ILESI with only minimal improvement. Patient reports that neck pain has recently flared despite compliance with PT/HEP and PO NSAIDs.  Patient denies new weakness, numbness or paresthesia.  Denies bowel/bladder dysfunction, fevers, chills, weight loss, night pain, or night sweats.\par \par 6/26/20\par 45 yo F who presents for follow up with low back and neck pain.  Patient reports that pain has been is about the same as last visit despite PT/HEP, mobic, cyclobenzaprine.  Patient reports that pain is present in lower cervical spine and worse with neck extension.  Previously, pain had radiated into right upper extremities but radicular symptoms have resolved following right C7-T1 interlaminar epidural injection on 1/17/20.  Patient reports pain is about 6-7/10.  Patient tolerating light duty at work but has been unable to progress to full duty due to her pain.  Patient denies new weakness, numbness or paresthesia.  Denies bowel/bladder dysfunction, fevers, chills, weight loss, night pain, or night sweats.\par \par 6/5/20\par 45 yo F who presents for follow up with low back and neck pain.  Patient reports that pain has improved markedly from her last appointment with further sessions of PT.  Patient reports taking mobic and cyclobenzaprine sporadically for pain with adequate relief.  Despite being on light duty, patient reports that she was named employee of the month and she feels quite comfortable in her new role at work.  Patient denies new weakness, numbness or paresthesia.  Denies bowel/bladder dysfunction, fevers, chills, weight loss, night pain, or night sweats.\par \par 5/15/20\par 45 yo F who presents for follow up with low back and neck pain.  Patient reports that pain had been worse earlier this month but she reports that the demands at her work was very intense given the covid-19 outbreak.  Patient also reports that, due to work demands, she was only able to attend 1 PT session per week.  Patient continues to take cyclobenzaprine with significant improvement in her pain.  Patient reports that her work demands have steadily decreased as covid-19 improves and her pain has been significantly improved.  Pain now mild to moderate in intensity.  Patient denies new weakness, numbness or paresthesia.  Denies bowel/bladder dysfunction, fevers, chills, weight loss, night pain, or night sweats.\par \par 4/17/20\par 45 yo F who presents for follow up with low back and neck pain.  Patient reports that she has started light duty at work with periodic worsening of pain especially at the end of her shift.  Patient reports that this symptom has improved with transitioning to day shift.  Patient reports that she continues to participated in PT/HEP with significant improvement.  Patient takes occasional mobic for pain relief.  Patient denies new weakness, numbness or paresthesia.  Denies bowel/bladder dysfunction, fevers, chills, weight loss, night pain, or night sweats.\par \par 2/26/20\par 45 yo F who presents for follow up with low back and neck pain.  Patient reports almost complete resolution of her neck pain since her last appointment.  Patient continues to participate in PT and HEP and is continuing to see progress in pain.  Patient denies new weakness, numbness or paresthesia.  Denies bowel/bladder dysfunction, fevers, chills, weight loss, night pain, or night sweats.\par \par 2/5/20\par 45 yo F who presents for follow up with low back and neck pain.  Patient reports that pain has improved significantly following right C7-T1 ILESI on 1/17/20.  Patient reports that pain is mild and is now 3/10 in severity.  Patient reports no PT/HEP since her last injection.  Patient denies new weakness, numbness or paresthesia.  Denies bowel/bladder dysfunction, fevers, chills, weight loss, night pain, or night sweats.\par \par 1/13/20\par 45 yo F who presents for follow up with low back and neck pain.  Patient reports that she continues to have neck pain that radiates into the right upper extremity.  Neck pain has significantly improved since her last visit but is still moderate to severe in intensity.  Patient continues to take cyclobenzaprine with significant improvement in her pain.  Patient denies new weakness, numbness or paresthesia.  Denies bowel/bladder dysfunction, fevers, chills, weight loss, night pain, or night sweats.\par \par 12/2/19\par 45 yo F who presents for follow up with low back and neck pain.  Patient reports that low back pain is near complete resolution.  However, patient continues to complain of neck pain with radiation right upper extremity.  Pain is worse with neck movements and improves with rest.  Pain has been refractory to conservative care with PT/HEP, mobic, and PO corticosteroids.  Patient denies previous injections.  Denies new weakness, numbness or paresthesia.  Denies bowel/bladder dysfunction, fevers, chills, weight loss, night pain, or night sweats.\par \par \par 11/11/19\par 44 year old F who presents for follow up with low back and neck pain.  In the interim since her last visit, patient reports that she has participated in PT and HEP and completed the prescribe course of mobic with significant improvement in her pain.  Pain in the low back has now almost completely resolved.  However, patient complains that she has neck pain that started after her injury on 9/25/19.  Patient reports that this pain is improving but persists and is now 5/10 in severity.  Pain localized to right lower cervical spine without radiation.  Worse with neck movements and neck extension.  Improved with PT and rest.   Patient has also been compliant with recommendations for medrol dose pack.  Patient denies new trauma or falls.   Patient denies new weakness, numbness or paresthesia.  Patient denies bowel/bladder dysfunction, fevers, chills, weight loss, night pain, or night sweats.\par

## 2021-07-19 ENCOUNTER — APPOINTMENT (OUTPATIENT)
Dept: ORTHOPEDIC SURGERY | Facility: CLINIC | Age: 46
End: 2021-07-19
Payer: COMMERCIAL

## 2021-07-19 VITALS — BODY MASS INDEX: 29.16 KG/M2 | WEIGHT: 175 LBS | HEIGHT: 65 IN

## 2021-07-19 PROCEDURE — 99072 ADDL SUPL MATRL&STAF TM PHE: CPT

## 2021-07-19 PROCEDURE — 99205 OFFICE O/P NEW HI 60 MIN: CPT

## 2021-07-19 PROCEDURE — 72040 X-RAY EXAM NECK SPINE 2-3 VW: CPT

## 2021-07-21 ENCOUNTER — FORM ENCOUNTER (OUTPATIENT)
Age: 46
End: 2021-07-21

## 2021-07-29 ENCOUNTER — FORM ENCOUNTER (OUTPATIENT)
Age: 46
End: 2021-07-29

## 2021-10-01 ENCOUNTER — NON-APPOINTMENT (OUTPATIENT)
Age: 46
End: 2021-10-01

## 2021-10-07 ENCOUNTER — APPOINTMENT (OUTPATIENT)
Dept: PHYSICAL MEDICINE AND REHAB | Facility: CLINIC | Age: 46
End: 2021-10-07

## 2021-10-25 ENCOUNTER — EMERGENCY (EMERGENCY)
Facility: HOSPITAL | Age: 46
LOS: 1 days | Discharge: HOME | End: 2021-10-25
Attending: STUDENT IN AN ORGANIZED HEALTH CARE EDUCATION/TRAINING PROGRAM
Payer: COMMERCIAL

## 2021-10-25 VITALS
WEIGHT: 186.95 LBS | OXYGEN SATURATION: 98 % | DIASTOLIC BLOOD PRESSURE: 111 MMHG | SYSTOLIC BLOOD PRESSURE: 167 MMHG | TEMPERATURE: 98 F | HEIGHT: 65 IN | RESPIRATION RATE: 20 BRPM | HEART RATE: 85 BPM

## 2021-10-25 VITALS
SYSTOLIC BLOOD PRESSURE: 128 MMHG | TEMPERATURE: 98 F | RESPIRATION RATE: 17 BRPM | OXYGEN SATURATION: 99 % | HEART RATE: 83 BPM | DIASTOLIC BLOOD PRESSURE: 79 MMHG

## 2021-10-25 LAB
ALBUMIN SERPL ELPH-MCNC: 4.1 G/DL — SIGNIFICANT CHANGE UP (ref 3.3–5)
ALP SERPL-CCNC: 64 U/L — SIGNIFICANT CHANGE UP (ref 40–120)
ALT FLD-CCNC: 12 U/L — SIGNIFICANT CHANGE UP (ref 10–45)
ANION GAP SERPL CALC-SCNC: 15 MMOL/L — SIGNIFICANT CHANGE UP (ref 5–17)
APPEARANCE UR: CLEAR — SIGNIFICANT CHANGE UP
AST SERPL-CCNC: 13 U/L — SIGNIFICANT CHANGE UP (ref 10–40)
BASOPHILS # BLD AUTO: 0.03 K/UL — SIGNIFICANT CHANGE UP (ref 0–0.2)
BASOPHILS NFR BLD AUTO: 0.3 % — SIGNIFICANT CHANGE UP (ref 0–2)
BILIRUB SERPL-MCNC: 0.2 MG/DL — SIGNIFICANT CHANGE UP (ref 0.2–1.2)
BILIRUB UR-MCNC: NEGATIVE — SIGNIFICANT CHANGE UP
BUN SERPL-MCNC: 9 MG/DL — SIGNIFICANT CHANGE UP (ref 7–23)
CALCIUM SERPL-MCNC: 8.6 MG/DL — SIGNIFICANT CHANGE UP (ref 8.4–10.5)
CHLORIDE SERPL-SCNC: 103 MMOL/L — SIGNIFICANT CHANGE UP (ref 96–108)
CO2 SERPL-SCNC: 20 MMOL/L — LOW (ref 22–31)
COLOR SPEC: COLORLESS — SIGNIFICANT CHANGE UP
CREAT SERPL-MCNC: 0.59 MG/DL — SIGNIFICANT CHANGE UP (ref 0.5–1.3)
DIFF PNL FLD: NEGATIVE — SIGNIFICANT CHANGE UP
EOSINOPHIL # BLD AUTO: 0.24 K/UL — SIGNIFICANT CHANGE UP (ref 0–0.5)
EOSINOPHIL NFR BLD AUTO: 2.6 % — SIGNIFICANT CHANGE UP (ref 0–6)
GLUCOSE SERPL-MCNC: 89 MG/DL — SIGNIFICANT CHANGE UP (ref 70–99)
GLUCOSE UR QL: NEGATIVE — SIGNIFICANT CHANGE UP
HCG SERPL-ACNC: <2 MIU/ML — SIGNIFICANT CHANGE UP
HCT VFR BLD CALC: 38.9 % — SIGNIFICANT CHANGE UP (ref 34.5–45)
HGB BLD-MCNC: 12.2 G/DL — SIGNIFICANT CHANGE UP (ref 11.5–15.5)
HIV 1 & 2 AB SERPL IA.RAPID: SIGNIFICANT CHANGE UP
IMM GRANULOCYTES NFR BLD AUTO: 0.4 % — SIGNIFICANT CHANGE UP (ref 0–1.5)
KETONES UR-MCNC: NEGATIVE — SIGNIFICANT CHANGE UP
LEUKOCYTE ESTERASE UR-ACNC: NEGATIVE — SIGNIFICANT CHANGE UP
LYMPHOCYTES # BLD AUTO: 3.25 K/UL — SIGNIFICANT CHANGE UP (ref 1–3.3)
LYMPHOCYTES # BLD AUTO: 35.5 % — SIGNIFICANT CHANGE UP (ref 13–44)
MAGNESIUM SERPL-MCNC: 2.1 MG/DL — SIGNIFICANT CHANGE UP (ref 1.6–2.6)
MCHC RBC-ENTMCNC: 27.9 PG — SIGNIFICANT CHANGE UP (ref 27–34)
MCHC RBC-ENTMCNC: 31.4 GM/DL — LOW (ref 32–36)
MCV RBC AUTO: 88.8 FL — SIGNIFICANT CHANGE UP (ref 80–100)
MONOCYTES # BLD AUTO: 0.61 K/UL — SIGNIFICANT CHANGE UP (ref 0–0.9)
MONOCYTES NFR BLD AUTO: 6.7 % — SIGNIFICANT CHANGE UP (ref 2–14)
NEUTROPHILS # BLD AUTO: 4.99 K/UL — SIGNIFICANT CHANGE UP (ref 1.8–7.4)
NEUTROPHILS NFR BLD AUTO: 54.5 % — SIGNIFICANT CHANGE UP (ref 43–77)
NITRITE UR-MCNC: NEGATIVE — SIGNIFICANT CHANGE UP
NRBC # BLD: 0 /100 WBCS — SIGNIFICANT CHANGE UP (ref 0–0)
PH UR: 6.5 — SIGNIFICANT CHANGE UP (ref 5–8)
PLATELET # BLD AUTO: 315 K/UL — SIGNIFICANT CHANGE UP (ref 150–400)
POTASSIUM SERPL-MCNC: 3.9 MMOL/L — SIGNIFICANT CHANGE UP (ref 3.5–5.3)
POTASSIUM SERPL-SCNC: 3.9 MMOL/L — SIGNIFICANT CHANGE UP (ref 3.5–5.3)
PROT SERPL-MCNC: 6.6 G/DL — SIGNIFICANT CHANGE UP (ref 6–8.3)
PROT UR-MCNC: NEGATIVE — SIGNIFICANT CHANGE UP
RBC # BLD: 4.38 M/UL — SIGNIFICANT CHANGE UP (ref 3.8–5.2)
RBC # FLD: 12.5 % — SIGNIFICANT CHANGE UP (ref 10.3–14.5)
SODIUM SERPL-SCNC: 138 MMOL/L — SIGNIFICANT CHANGE UP (ref 135–145)
SP GR SPEC: 1.01 — LOW (ref 1.01–1.02)
TROPONIN T, HIGH SENSITIVITY RESULT: <6 NG/L — SIGNIFICANT CHANGE UP (ref 0–51)
UROBILINOGEN FLD QL: NEGATIVE — SIGNIFICANT CHANGE UP
WBC # BLD: 9.16 K/UL — SIGNIFICANT CHANGE UP (ref 3.8–10.5)
WBC # FLD AUTO: 9.16 K/UL — SIGNIFICANT CHANGE UP (ref 3.8–10.5)

## 2021-10-25 PROCEDURE — 80053 COMPREHEN METABOLIC PANEL: CPT

## 2021-10-25 PROCEDURE — 99284 EMERGENCY DEPT VISIT MOD MDM: CPT

## 2021-10-25 PROCEDURE — 84702 CHORIONIC GONADOTROPIN TEST: CPT

## 2021-10-25 PROCEDURE — 93005 ELECTROCARDIOGRAM TRACING: CPT

## 2021-10-25 PROCEDURE — 84484 ASSAY OF TROPONIN QUANT: CPT

## 2021-10-25 PROCEDURE — 99285 EMERGENCY DEPT VISIT HI MDM: CPT

## 2021-10-25 PROCEDURE — 85025 COMPLETE CBC W/AUTO DIFF WBC: CPT

## 2021-10-25 PROCEDURE — 81003 URINALYSIS AUTO W/O SCOPE: CPT

## 2021-10-25 PROCEDURE — 83735 ASSAY OF MAGNESIUM: CPT

## 2021-10-25 PROCEDURE — 86703 HIV-1/HIV-2 1 RESULT ANTBDY: CPT

## 2021-10-25 PROCEDURE — 93010 ELECTROCARDIOGRAM REPORT: CPT

## 2021-10-25 RX ORDER — ACETAMINOPHEN 500 MG
650 TABLET ORAL ONCE
Refills: 0 | Status: COMPLETED | OUTPATIENT
Start: 2021-10-25 | End: 2021-10-25

## 2021-10-25 RX ORDER — SODIUM CHLORIDE 9 MG/ML
1000 INJECTION INTRAMUSCULAR; INTRAVENOUS; SUBCUTANEOUS ONCE
Refills: 0 | Status: COMPLETED | OUTPATIENT
Start: 2021-10-25 | End: 2021-10-25

## 2021-10-25 RX ADMIN — Medication 650 MILLIGRAM(S): at 02:24

## 2021-10-25 RX ADMIN — SODIUM CHLORIDE 1000 MILLILITER(S): 9 INJECTION INTRAMUSCULAR; INTRAVENOUS; SUBCUTANEOUS at 02:44

## 2021-10-25 NOTE — ED PROVIDER NOTE - NS ED ROS FT
GENERAL: no fever  EYES: no eye pain  HEENT: no neck pain  CARDIAC: no chest pain  PULMONARY: positive for SOB  GI: no abdominal pain  : no dysuria  SKIN: no rashes  NEURO: no headache  MSK: no new joint pain

## 2021-10-25 NOTE — ED PROVIDER NOTE - PHYSICAL EXAMINATION
Gen: NAD, non-toxic appearing  Head: normal appearing  HEENT: normal conjunctiva  Lung: no respiratory distress, speaking in full sentences, clear breath sounds bilaterally  CV: regular rate and rhythm, no murmurs  Abd: soft, non distended, no tenderness. the pt says she has to urinate when I press on her lower abdomen  MSK: no visible deformities  Neuro: alert and grossly oriented, no gross motor deficits  Skin: No jann rashes

## 2021-10-25 NOTE — ED PROVIDER NOTE - NSFOLLOWUPINSTRUCTIONS_ED_ALL_ED_FT
1) Follow up with your doctor, if you cannot get in with your doctor, please call the family medicine clinic referred to above.   2) Return to the ED immediately for new or worsening symptoms   3) Please continue to take any home medications as prescribed  4) Your test results from your ED visit were discussed with you prior to discharge  5) You were provided with a copy of your test results

## 2021-10-25 NOTE — ED PROVIDER NOTE - CLINICAL SUMMARY MEDICAL DECISION MAKING FREE TEXT BOX
47 yo w/ AM nausea and missed MP. VSS. Benign abdomen. Likely pregnancy vs UTI. concurrent sob, obtain ekg and troponin to assess for acs. r/out metabolic derangement. Plan = EKG, serum hcg, CMP/Mg, CBC, UA, troponin. pt refused nausea medication.

## 2021-10-25 NOTE — ED ADULT NURSE NOTE - OBJECTIVE STATEMENT
patient is a 47 y/o F with hx no pertinent PMH who presents to the ED c/o nausea and abd pain x1 day. patient states that this morning she was very anxious and nauseous and when she took her BP it was 150s/90s. during this period patient states that she was experiencing SOB that was worsened with inspiration which has now resolved. patient states that she has been very nauseous with dry heaving but no vomiting. patient states that she has "crampy" pain in the RLQ that radiates upward. patients LMP 2 months ago and states that her period is normally regular. last BM last night which was regular for her.   patient is a&ox4, PERRL. strong peripheral pulses, strong extremities x4. ambulatory independently with steady gait. respirations even and unlabored with symmetrical chest rise. abdomen soft and nondistended. skin intact. patient denies CP, h/a, dizziness, weakness, numbness/tingling, vision changes, urinary symptoms, cough, fever, chills, recent travel, or sick contacts

## 2021-10-25 NOTE — ED PROVIDER NOTE - PROGRESS NOTE DETAILS
Wai Tyler M.D. (PGY-1): Troponin undetectable. pregnancy test negative. CBC negative, e- = non-AG acidosis, possible mild dehydration. UA unremarkable. ECG = normal. patient feeling better since arriving to the department. she was able to hold down food and fluid. low suspicion for critical pathology at this time. Plan = discharge w/ close follow up w/ PMD (provided number for family clinic here if the patient cannot get in with someone else). patient happy w/ this plan. ED return precautions given.

## 2021-10-25 NOTE — ED ADULT NURSE NOTE - ED STAT RN HANDOFF DETAILS
break coverage RN; report given to primary RN Mariah break coverage RN; report given to primary RN Carolyn

## 2021-10-25 NOTE — ED PROVIDER NOTE - PATIENT PORTAL LINK FT
You can access the FollowMyHealth Patient Portal offered by [facility] by registering at the following website: [email or web address]. By joining [employer]’s FollowMyHealth portal, you will also be able to view your health information using other applications (apps) compatible with our system.

## 2021-10-25 NOTE — ED PROVIDER NOTE - OBJECTIVE STATEMENT
This is a 47 yo F who presents w/ nausea.  PMHx = cervical spine dx w/ L arm radiculopathy   Onset: Gradual onset in the AM persisting into today  Quantity/frequency of emesis: no vomiting  Associated amenorrhea (LMP 2 months ago, typically monthly and regular), mild lower abdominal pain and SOB.   No associated constipation, obstipation, diarrhea, vaginal bleeding. no associated chest pain.   Last ate at 1700 last night  No recent travel. No new foods. No sick contacts. No hx of DM. No hx of regular cannabis use. No recent exposure to toxins or drugs. No recent changes in medication. No hx of abdominal/pelvic surgery. Regular Rx = tylenol for cervical radicular pain.

## 2021-10-25 NOTE — ED PROVIDER NOTE - ATTENDING CONTRIBUTION TO CARE
46 F w hx of cervical spine dx w/ L arm radiculopathy here w/ gradual onset nausea. pt works at [employer] and reports that her symptoms occurred while at work. she has no cp, no sob, no abd pain, she states that she felt sick that she couldn't continue her job. Pt states that she was nauseous, and thinks that she is pregnant. She denies any trauma. no known sick contacts, no emesis, no diarrhea no constipation.   I have reviewed the triage vital signs. Const: no acute distress, Well-developed, Eyes: no conjunctival injection and no scleral icterus ENMT: Moist mucus membranes, CVS: +S1/S2, radial/DP pulse 2+ bilaterally RESP: Unlabored respiratory effort, Clear to auscultation bilaterally GI: Nontender/Nondistended soft abdomen, no CVA tenderness MSK: Extremities w/o deformity or ttp, Psych: Awake, Alert, & Orientedx3;  Appropriate mood and affect, cooperative  NO focal adbominal tenderness, no prior hx of abdominal surgery, no abdominal pain. here w/ nausea and concern she is pregnant. will obtain labs and reassess. S/p reassessment, pt w/ improvement in symptoms, states she feels comfortable going home at this time,

## 2021-10-25 NOTE — ED PROVIDER NOTE - NSFOLLOWUPCLINICS_GEN_ALL_ED_FT
[facility] - Primary Care  Primary Care  [address] [place] [address]  Phone: [phone]  Fax:

## 2021-11-01 ENCOUNTER — APPOINTMENT (OUTPATIENT)
Dept: ORTHOPEDIC SURGERY | Facility: CLINIC | Age: 46
End: 2021-11-01
Payer: COMMERCIAL

## 2021-11-01 DIAGNOSIS — M47.812 SPONDYLOSIS W/OUT MYELOPATHY OR RADICULOPATHY, CERVICAL REGION: ICD-10-CM

## 2021-11-01 DIAGNOSIS — M50.30 OTHER CERVICAL DISC DEGENERATION, UNSPECIFIED CERVICAL REGION: ICD-10-CM

## 2021-11-01 PROCEDURE — 99214 OFFICE O/P EST MOD 30 MIN: CPT
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
## 2021-11-01 NOTE — PHYSICAL EXAM
[] : Motor: [NL] : Motor strength of the right upper extremities is normal [Motor Strength Upper Extremities] : left (5/5) [LE Motor Strength NL] : Motor strength of the bilateral lower extremities was normal [UE/LE] : Sensory: Intact in bilateral upper & lower extremities [ALL] : dorsalis pedis, posterior tibial, femoral, popliteal, and radial 2+ and symmetric bilaterally [Normal] : Oriented to person, place, and time, insight and judgement were intact and the affect was normal [Cota's Sign] : negative Cota's sign [Pronator Drift] : negative pronator drift [SLR] : negative straight leg raise [de-identified] : Cervical ROM: \par NTTP C spine and b/l paracervicals.\par Skin intact C spine. No rashes, ulcers, blisters. \par No lymphedema. \par Rapid alternating movements- Intact. \par Full and non-painful ROM RUE, LUE, RLE, and LLE. Skin intact RUE, LUE, RLE, and LLE. No rashes, blisters, ulcers. NTTP RUE, LUE, RLE, and LLE. No evidence of dislocation or subluxation B/L.\par LUE Dumont/Neer test: positive\par  [de-identified] : 2 views AP and Lat of Cervical Spine from occipital to C7/T1 07/19/21. Read and dictated by Dr. Steven German Board Certified orthopaedic surgeon C5-C7 DDD\par \par EXAM: MR SPINE CERVICAL\par \par \par PROCEDURE DATE: 05/20/2021\par \par \par \par INTERPRETATION: EXAMINATION:MR CERVICAL SPINE\par \par CLINICAL INDICATION:Neck pain refractory to conservative treatment\par \par TECHNIQUE: Multi-planar, multi-sequence MR of the cervical spine was performed without intravenous contrast.\par \par COMPARISON: CT 3/4/2021, MRI 11/20/2019\par \par INTERPRETATION:\par \par BONES AND BONE MARROW: There is no evidence of fracture. There is no focal bone marrow edema or marrow replacement.\par INTERVERTEBRAL DISCS: The intervertebral disc signal and disc heights are normal.\par ALIGNMENT: The craniocervical junction and atlantoaxial intervals are maintained. The spinal alignment is maintained.\par SPINAL CORD: The spinal cord is normal in size and signal intensity.\par EXTRASPINAL: There is no prevertebral soft tissue swelling. There is no epidural mass or fluid collection. The paraspinal and included extraspinal soft tissues are unremarkable.\par \par Evaluation of individual disc space levels demonstrates the following:\par \par C2-C3: There is no significant spinal canal or neural foraminal stenosis.\par C3-C4: There is a small central posterior disc osteophyte complex. There is mild bilateral neural foraminal stenosis. There is no central canal stenosis.\par C4-C5: There is a small central posterior disc osteophyte complex. There is mild bilateral neural foraminal stenosis. There is no central canal stenosis.\par C5-C6: There is a left paracentral posterior disc osteophyte complex which indents the ventral thecal sac and contacts the ventral left aspect of the cord, similar to prior. There is mild central canal stenosis. There is moderate left neural foraminal stenosis. There is no right neural foraminal stenosis. Findings are similar to the prior exam.\par C6-C7: There is a central posterior disc osteophyte complex contributing to mild central canal stenosis. There is mild bilateral neural foraminal stenosis. Findings are similar to the prior exam.\par C7-T1: There is no significant spinal canal or neural foraminal stenosis.\par \par IMPRESSION:\par No interval change compared to the previous MRI cervical spine of 11/20/2019.\par \par Mild/moderate cervical spondylosis, predominant at C5-6 and C6-7. At those levels, mild central canal stenosis is present.\par \par \par \par SHLOMIT A GOLDBERG-STEIN M.D., ATTENDING RADIOLOGIST\par This document has been electronically signed. May 23 2021 3:24PM\par \par \par EXAM: CT CERVICAL SPINE\par \par EXAM: CT BRAIN\par \par \par PROCEDURE DATE: 03/04/2021\par \par \par \par \par INTERPRETATION: CLINICAL INDICATION: Headache, left arm tingling.\par \par TECHNIQUE: CT axial images of the head and cervical spine were obtained without intravenous contrast. Computer-reconstructed coronal and sagittal images were obtained.\par \par COMPARISON: C-spine MR 11/20/2019.\par \par FINDINGS:\par \par Head CT: There is no acute intracranial hemorrhage, large cortical infarct, mass effect or midline shift. Cortical sulci and ventricles are appropriate for the patient's stated age.\par \par There is no depressed skull fracture. There is minimal sinus mucosal thickening. Persistent nonspecific bilateral mastoid opacification.\par \par Cervical spine CT: There is straightening of the cervical lordosis, which may be related to patient positioning and/or muscle spasm. There is no obvious acute fracture or traumatic malalignment in the cervical spine. Again noted is chronic endplate depression in the visualized cervicothoracic spine. The craniocervical junction is preserved. Stable minimal asymmetry of the lateral atlantodental interval is likely due to positioning and/or ligamentous laxity.\par \par There are multilevel degenerative changes characterized by uncovertebral degenerative change, disc osteophyte complex and facet arthropathy in the cervical spine with resultant neural foraminal narrowing and spinal canal stenosis, especially at C5-C6 and C6-C7.\par \par There is no obvious prevertebral soft tissue edema. Evaluation of the spinal canal content is limited on this study. There is no hematoma in the visualized superficial soft tissue.\par \par Visualized upper chest: No pneumothorax. Pleural thickening.\par \par IMPRESSION:\par \par Head CT: No acute intracranial hemorrhage, large cortical infarct or mass effect. If clinically indicated, short-term follow-up or MRI may be obtained for further evaluation.\par \par Cervical spine CT: No obvious acute fracture or traumatic malalignment in the cervical spine. Multilevel degenerative changes of the cervical spine. Follow-up MRI may be obtained for further evaluation as clinically indicated.\par \par \par \par \par \par \par \par HAILEY BRIGGS MD; Attending Radiologist\par This document has been electronically signed. Mar 4 2021 2:45AM\par \par \par \par EXAM: MR SPINE CERVICAL \par \par \par PROCEDURE DATE: 11/20/2019 \par \par \par \par INTERPRETATION: \par CERVICAL SPINE MRI \par \par CLINICAL INFORMATION: Neck pain \par TECHNIQUE: Multiplanar, multisequence MRI was obtained of the cervical spine. \par \par FINDINGS: \par \par DISC LEVEL EVALUATION: \par \par C1/2: Normal \par C2/C3: Normal \par C3/C4: There is minimal disc bulging with small osteophyte formation. There \par is minimal foraminal narrowing. Disc minimally indents the ventral thecal \par sac without significant stenosis. \par C4/C5: There is minimal disc bulging with small osteophyte formation causing \par mild bilateral foraminal narrowing. Disc minimally indents the ventral \par thecal sac without significant stenosis. \par C5/C6: There is mild disc height loss with moderate circumferential disc \par bulging and osteophyte formation. Disc indents the ventral thecal sac and \par contacts the ventral aspect of the cord without cord compression or cord \par signal abnormality. There is moderate to severe bilateral foraminal \par narrowing. \par C6/C7: There is mild disc height loss with moderate circumferential disc \par bulging and osteophyte formation resulting in moderate to severe right and \par moderate left foraminal narrowing. Disc material indents the ventral thecal \par sac and contacts the ventral aspect of the cord without cord compression or \par cord signal abnormality. \par C7/T1: Normal \par \par ALIGNMENT: There is straightening of the normal cervical lordosis. \par CORD: There is no cord signal abnormality. \par MARROW: Mild endplate marrow edema is present at C6-C7. \par IMAGED BRAIN: Normal \par PERIPHERAL/NECK SOFT TISSUES: Mild fluid is present in the ethmoid air cells. \par \par IMPRESSION: Mild to moderate multilevel cervical spondylosis that is most \par prominent at C5-C6 and C6-C7 as described above. \par \par \par \par \par \par \par \par \par SAVANNA HARRINGTON M.D., ATTENDING RADIOLOGIST Phone #: [phone] \par This document has been electronically signed. Nov 20 2019 3:48PM

## 2021-11-01 NOTE — REASON FOR VISIT
[Initial Visit] : an initial visit for [Workers' Comp: Date of Injury: _______] : This visit is related to worker's compensation. Date of Injury: [unfilled] [Neck Pain] : neck pain

## 2021-11-01 NOTE — DISCUSSION/SUMMARY
[de-identified] : 44 yo female with C5-C7 DDD, with return of symptoms, failed PT, NSAIDS, and MARCIA, recommend ACDF C5-C7. For lumbar spine recommend MR of L Spine. \par \par \par The [facility] report did not acknowledge MRI findings, the patient has not reached maximum medical improvement, and based on workers compensation guidelines, she is indicated for surgery.\par \par I reviewed all major risks, benefits and complications associated with Anterior Cervical Discectomy and Spinal Fusion including but not limited to bleeding, infection, neurological injury, CSF leak, paralysis, hematoma, implant failure, implant migration, pseudarthrosis, adjacent segment degeneration, chronic pain, worsening of pain, dysphagia, dysphonia, horners syndrome, vascular injury, anesthetic risk, chronic pain and disability, medical complications (GI, , DVT, PE, cardiac, pulmonary, etc) and risk of mortality. \par \par Diagnosis, prognosis, natural history and treatment was discussed with patient. Patient was advised if the following symptoms develop: chills, fever, \par loss of bladder control, bowel incontinence or urinary retention, numbness/tingling or weakness is present in upper or lower extremities, to go to the nearest emergency room. This may be a new clinical condition not present at the time of the patient visit  that may lead to paralysis and/or death, Patient advised if the above symptoms developed to also call the office immediately to inform us and to go to the nearest emergency room.\par

## 2021-11-01 NOTE — ASSESSMENT
[Indicate if, in your opinion, the incident that the patient described was the competent medical cause of this injury/illness.] : The incident that the patient described was the competent medical cause of this injury/illness: Yes [Indicate if the patient's complaints are consistent with his/her history of the injury/illness.] : Indicate if the patient's complaints are consistent with his/her history of the injury/illness: Yes

## 2021-11-24 ENCOUNTER — APPOINTMENT (OUTPATIENT)
Dept: INTERNAL MEDICINE | Facility: CLINIC | Age: 46
End: 2021-11-24
Payer: COMMERCIAL

## 2021-11-24 ENCOUNTER — NON-APPOINTMENT (OUTPATIENT)
Age: 46
End: 2021-11-24

## 2021-11-24 VITALS
BODY MASS INDEX: 29.16 KG/M2 | SYSTOLIC BLOOD PRESSURE: 138 MMHG | HEART RATE: 80 BPM | OXYGEN SATURATION: 97 % | WEIGHT: 175 LBS | HEIGHT: 65 IN | TEMPERATURE: 97.6 F | DIASTOLIC BLOOD PRESSURE: 80 MMHG | RESPIRATION RATE: 17 BRPM

## 2021-11-24 DIAGNOSIS — R79.89 OTHER SPECIFIED ABNORMAL FINDINGS OF BLOOD CHEMISTRY: ICD-10-CM

## 2021-11-24 DIAGNOSIS — Z00.00 ENCOUNTER FOR GENERAL ADULT MEDICAL EXAMINATION W/OUT ABNORMAL FINDINGS: ICD-10-CM

## 2021-11-24 DIAGNOSIS — E78.00 PURE HYPERCHOLESTEROLEMIA, UNSPECIFIED: ICD-10-CM

## 2021-11-24 DIAGNOSIS — Z23 ENCOUNTER FOR IMMUNIZATION: ICD-10-CM

## 2021-11-24 DIAGNOSIS — Z11.1 ENCOUNTER FOR SCREENING FOR RESPIRATORY TUBERCULOSIS: ICD-10-CM

## 2021-11-24 DIAGNOSIS — E66.3 OVERWEIGHT: ICD-10-CM

## 2021-11-24 DIAGNOSIS — R07.9 CHEST PAIN, UNSPECIFIED: ICD-10-CM

## 2021-11-24 DIAGNOSIS — Z78.9 OTHER SPECIFIED HEALTH STATUS: ICD-10-CM

## 2021-11-24 PROCEDURE — 99396 PREV VISIT EST AGE 40-64: CPT | Mod: 25

## 2021-11-24 PROCEDURE — 36415 COLL VENOUS BLD VENIPUNCTURE: CPT

## 2021-11-24 PROCEDURE — 99214 OFFICE O/P EST MOD 30 MIN: CPT | Mod: 25

## 2021-11-24 PROCEDURE — 86580 TB INTRADERMAL TEST: CPT

## 2021-11-24 PROCEDURE — 93000 ELECTROCARDIOGRAM COMPLETE: CPT | Mod: 59

## 2021-11-26 ENCOUNTER — APPOINTMENT (OUTPATIENT)
Dept: INTERNAL MEDICINE | Facility: CLINIC | Age: 46
End: 2021-11-26
Payer: COMMERCIAL

## 2021-11-26 ENCOUNTER — MED ADMIN CHARGE (OUTPATIENT)
Age: 46
End: 2021-11-26

## 2021-11-26 PROCEDURE — 90686 IIV4 VACC NO PRSV 0.5 ML IM: CPT

## 2021-11-26 PROCEDURE — G0008: CPT

## 2021-11-29 PROBLEM — E78.00 ELEVATED LDL CHOLESTEROL LEVEL: Status: ACTIVE | Noted: 2021-11-29

## 2021-11-29 PROBLEM — R79.89 LOW VITAMIN D LEVEL: Status: ACTIVE | Noted: 2021-11-29

## 2021-11-29 PROBLEM — R07.9 CHEST PAIN: Status: ACTIVE | Noted: 2021-11-24

## 2021-11-29 NOTE — HISTORY OF PRESENT ILLNESS
[FreeTextEntry1] : annual wellness exam  [de-identified] : 47 y/o female presents for annual wellness exam. She needs forms and Tb testing for work.  She reports having substernal chest pain for the past few weeks. She does heavy lifting with her job. She feels otherwise well and offers no other acute complaints or concerns. ROS as documented below.

## 2021-11-29 NOTE — HEALTH RISK ASSESSMENT
[No] : No [0] : 2) Feeling down, depressed, or hopeless: Not at all (0) [PHQ-2 Negative - No further assessment needed] : PHQ-2 Negative - No further assessment needed [] : No [[id number]] : 0

## 2021-11-29 NOTE — REVIEW OF SYSTEMS
[Chest Pain] : chest pain [Fever] : no fever [Chills] : no chills [Recent Change In Weight] : ~T no recent weight change [Vision Problems] : no vision problems [Earache] : no earache [Nasal Discharge] : no nasal discharge [Sore Throat] : no sore throat [Palpitations] : no palpitations [Shortness Of Breath] : no shortness of breath [Wheezing] : no wheezing [Abdominal Pain] : no abdominal pain [Nausea] : no nausea [Diarrhea] : diarrhea [Vomiting] : no vomiting [Dysuria] : no dysuria [Hematuria] : no hematuria [Frequency] : no frequency [Joint Pain] : no joint pain [Joint Swelling] : no joint swelling [Skin Rash] : no skin rash [Headache] : no headache [Dizziness] : no dizziness [Anxiety] : no anxiety [Depression] : no depression [Swollen Glands] : no swollen glands

## 2021-11-30 LAB
25(OH)D3 SERPL-MCNC: 23.5 NG/ML
ALBUMIN SERPL ELPH-MCNC: 4.3 G/DL
ALP BLD-CCNC: 76 U/L
ALT SERPL-CCNC: 14 U/L
ANION GAP SERPL CALC-SCNC: 14 MMOL/L
APPEARANCE: CLEAR
AST SERPL-CCNC: 17 U/L
BASOPHILS # BLD AUTO: 0.06 K/UL
BASOPHILS NFR BLD AUTO: 0.5 %
BILIRUB SERPL-MCNC: 0.2 MG/DL
BILIRUBIN URINE: NEGATIVE
BLOOD URINE: NEGATIVE
BUN SERPL-MCNC: 8 MG/DL
CALCIUM SERPL-MCNC: 9.4 MG/DL
CHLORIDE SERPL-SCNC: 100 MMOL/L
CHOLEST SERPL-MCNC: 204 MG/DL
CO2 SERPL-SCNC: 22 MMOL/L
COLOR: NORMAL
CREAT SERPL-MCNC: 0.6 MG/DL
EOSINOPHIL # BLD AUTO: 0.35 K/UL
EOSINOPHIL NFR BLD AUTO: 3.1 %
ESTIMATED AVERAGE GLUCOSE: 108 MG/DL
GLUCOSE QUALITATIVE U: NEGATIVE
GLUCOSE SERPL-MCNC: 74 MG/DL
HBA1C MFR BLD HPLC: 5.4 %
HCT VFR BLD CALC: 43.3 %
HDLC SERPL-MCNC: 46 MG/DL
HGB BLD-MCNC: 13.6 G/DL
IMM GRANULOCYTES NFR BLD AUTO: 0.7 %
KETONES URINE: NEGATIVE
LDLC SERPL CALC-MCNC: 127 MG/DL
LEUKOCYTE ESTERASE URINE: NEGATIVE
LYMPHOCYTES # BLD AUTO: 3.42 K/UL
LYMPHOCYTES NFR BLD AUTO: 30.6 %
MAN DIFF?: NORMAL
MCHC RBC-ENTMCNC: 28.5 PG
MCHC RBC-ENTMCNC: 31.4 GM/DL
MCV RBC AUTO: 90.6 FL
MONOCYTES # BLD AUTO: 0.61 K/UL
MONOCYTES NFR BLD AUTO: 5.5 %
NEUTROPHILS # BLD AUTO: 6.67 K/UL
NEUTROPHILS NFR BLD AUTO: 59.6 %
NITRITE URINE: NEGATIVE
NONHDLC SERPL-MCNC: 158 MG/DL
PH URINE: 6
PLATELET # BLD AUTO: 438 K/UL
POTASSIUM SERPL-SCNC: 4.4 MMOL/L
PROT SERPL-MCNC: 7 G/DL
PROTEIN URINE: NEGATIVE
RBC # BLD: 4.78 M/UL
RBC # FLD: 12.6 %
SODIUM SERPL-SCNC: 136 MMOL/L
SPECIFIC GRAVITY URINE: 1.02
TRIGL SERPL-MCNC: 157 MG/DL
TSH SERPL-ACNC: 2.47 UIU/ML
UROBILINOGEN URINE: NORMAL
WBC # FLD AUTO: 11.19 K/UL

## 2021-12-22 ENCOUNTER — NON-APPOINTMENT (OUTPATIENT)
Age: 46
End: 2021-12-22

## 2022-05-24 ENCOUNTER — APPOINTMENT (OUTPATIENT)
Dept: INTERNAL MEDICINE | Facility: CLINIC | Age: 47
End: 2022-05-24

## 2022-06-17 ENCOUNTER — APPOINTMENT (OUTPATIENT)
Dept: INTERNAL MEDICINE | Facility: CLINIC | Age: 47
End: 2022-06-17
Payer: COMMERCIAL

## 2022-06-17 VITALS
WEIGHT: 175 LBS | HEART RATE: 80 BPM | TEMPERATURE: 97.6 F | SYSTOLIC BLOOD PRESSURE: 126 MMHG | RESPIRATION RATE: 17 BRPM | OXYGEN SATURATION: 98 % | DIASTOLIC BLOOD PRESSURE: 82 MMHG | BODY MASS INDEX: 29.16 KG/M2 | HEIGHT: 65 IN

## 2022-06-17 DIAGNOSIS — M48.061 SPINAL STENOSIS, LUMBAR REGION WITHOUT NEUROGENIC CLAUDICATION: ICD-10-CM

## 2022-06-17 PROCEDURE — 99214 OFFICE O/P EST MOD 30 MIN: CPT

## 2022-06-19 PROBLEM — M48.061 LUMBAR STENOSIS: Status: ACTIVE | Noted: 2019-10-14

## 2022-06-19 PROBLEM — Z78.9 NO HISTORY OF ALCOHOL USE: Status: ACTIVE | Noted: 2022-06-19

## 2022-06-19 NOTE — PHYSICAL EXAM
[No Acute Distress] : no acute distress [Well Nourished] : well nourished [Well Developed] : well developed [Well-Appearing] : well-appearing [Normal Sclera/Conjunctiva] : normal sclera/conjunctiva [PERRL] : pupils equal round and reactive to light [EOMI] : extraocular movements intact [Normal Outer Ear/Nose] : the outer ears and nose were normal in appearance [Normal Oropharynx] : the oropharynx was normal [No JVD] : no jugular venous distention [No Lymphadenopathy] : no lymphadenopathy [Supple] : supple [Thyroid Normal, No Nodules] : the thyroid was normal and there were no nodules present [No Respiratory Distress] : no respiratory distress  [No Accessory Muscle Use] : no accessory muscle use [Clear to Auscultation] : lungs were clear to auscultation bilaterally [Normal Rate] : normal rate  [Regular Rhythm] : with a regular rhythm [Normal S1, S2] : normal S1 and S2 [No Murmur] : no murmur heard [No Carotid Bruits] : no carotid bruits [No Abdominal Bruit] : a ~M bruit was not heard ~T in the abdomen [No Varicosities] : no varicosities [Pedal Pulses Present] : the pedal pulses are present [No Edema] : there was no peripheral edema [No Palpable Aorta] : no palpable aorta [No Extremity Clubbing/Cyanosis] : no extremity clubbing/cyanosis [Soft] : abdomen soft [Non Tender] : non-tender [Non-distended] : non-distended [No Masses] : no abdominal mass palpated [No HSM] : no HSM [Normal Bowel Sounds] : normal bowel sounds [Normal Posterior Cervical Nodes] : no posterior cervical lymphadenopathy [Normal Anterior Cervical Nodes] : no anterior cervical lymphadenopathy [No CVA Tenderness] : no CVA  tenderness [No Spinal Tenderness] : no spinal tenderness [No Joint Swelling] : no joint swelling [Grossly Normal Strength/Tone] : grossly normal strength/tone [No Rash] : no rash [Coordination Grossly Intact] : coordination grossly intact [No Focal Deficits] : no focal deficits [Normal Gait] : normal gait [Deep Tendon Reflexes (DTR)] : deep tendon reflexes were 2+ and symmetric [Normal Affect] : the affect was normal [Normal Insight/Judgement] : insight and judgment were intact [de-identified] : trapez/paracervical spasm, tend. , paralumbar spasm
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
## 2022-06-20 ENCOUNTER — APPOINTMENT (OUTPATIENT)
Dept: PHYSICAL MEDICINE AND REHAB | Facility: CLINIC | Age: 47
End: 2022-06-20

## 2022-07-12 ENCOUNTER — APPOINTMENT (OUTPATIENT)
Dept: INTERNAL MEDICINE | Facility: CLINIC | Age: 47
End: 2022-07-12

## 2022-07-12 ENCOUNTER — APPOINTMENT (OUTPATIENT)
Dept: PHYSICAL MEDICINE AND REHAB | Facility: CLINIC | Age: 47
End: 2022-07-12

## 2022-07-12 VITALS
WEIGHT: 177 LBS | OXYGEN SATURATION: 97 % | HEART RATE: 94 BPM | RESPIRATION RATE: 17 BRPM | SYSTOLIC BLOOD PRESSURE: 120 MMHG | HEIGHT: 65 IN | TEMPERATURE: 97.4 F | DIASTOLIC BLOOD PRESSURE: 78 MMHG | BODY MASS INDEX: 29.49 KG/M2
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
## 2022-07-12 DIAGNOSIS — Z00.8 ENCOUNTER FOR OTHER GENERAL EXAMINATION: ICD-10-CM

## 2022-07-12 DIAGNOSIS — M54.16 RADICULOPATHY, LUMBAR REGION: ICD-10-CM

## 2022-07-12 DIAGNOSIS — M54.12 RADICULOPATHY, CERVICAL REGION: ICD-10-CM

## 2022-07-12 DIAGNOSIS — M54.6 PAIN IN THORACIC SPINE: ICD-10-CM

## 2022-07-12 PROCEDURE — 99072 ADDL SUPL MATRL&STAF TM PHE: CPT

## 2022-07-12 PROCEDURE — 99215 OFFICE O/P EST HI 40 MIN: CPT

## 2022-07-12 PROCEDURE — 99213 OFFICE O/P EST LOW 20 MIN: CPT

## 2022-07-12 NOTE — PHYSICAL EXAM
[No Acute Distress] : no acute distress [Well Nourished] : well nourished [Well Developed] : well developed [Well-Appearing] : well-appearing [Normal Sclera/Conjunctiva] : normal sclera/conjunctiva [PERRL] : pupils equal round and reactive to light [EOMI] : extraocular movements intact [Normal Outer Ear/Nose] : the outer ears and nose were normal in appearance [Normal Oropharynx] : the oropharynx was normal [No JVD] : no jugular venous distention [No Lymphadenopathy] : no lymphadenopathy [Supple] : supple [Thyroid Normal, No Nodules] : the thyroid was normal and there were no nodules present [No Respiratory Distress] : no respiratory distress  [No Accessory Muscle Use] : no accessory muscle use [Clear to Auscultation] : lungs were clear to auscultation bilaterally [Normal Rate] : normal rate  [Regular Rhythm] : with a regular rhythm [Normal S1, S2] : normal S1 and S2 [No Murmur] : no murmur heard [No Carotid Bruits] : no carotid bruits [No Abdominal Bruit] : a ~M bruit was not heard ~T in the abdomen [No Varicosities] : no varicosities [Pedal Pulses Present] : the pedal pulses are present [No Edema] : there was no peripheral edema [No Palpable Aorta] : no palpable aorta [No Extremity Clubbing/Cyanosis] : no extremity clubbing/cyanosis [Soft] : abdomen soft [Non Tender] : non-tender [Non-distended] : non-distended [No Masses] : no abdominal mass palpated [No HSM] : no HSM [Normal Bowel Sounds] : normal bowel sounds [Normal Posterior Cervical Nodes] : no posterior cervical lymphadenopathy [Normal Anterior Cervical Nodes] : no anterior cervical lymphadenopathy [No CVA Tenderness] : no CVA  tenderness [No Spinal Tenderness] : no spinal tenderness [No Joint Swelling] : no joint swelling [Grossly Normal Strength/Tone] : grossly normal strength/tone [No Rash] : no rash [Coordination Grossly Intact] : coordination grossly intact [No Focal Deficits] : no focal deficits [Normal Gait] : normal gait [Deep Tendon Reflexes (DTR)] : deep tendon reflexes were 2+ and symmetric [Normal Affect] : the affect was normal [Normal Insight/Judgement] : insight and judgment were intact [de-identified] : paralumbar spasm, tenderness.
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
## 2022-07-13 PROBLEM — M54.12 CERVICAL RADICULOPATHY: Status: ACTIVE | Noted: 2019-12-02
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
## 2022-07-13 PROBLEM — M54.6 THORACIC SPINE PAIN: Status: ACTIVE | Noted: 2022-07-13

## 2022-07-13 PROBLEM — M54.16 LUMBAR RADICULOPATHY: Status: ACTIVE | Noted: 2019-10-14

## 2022-07-13 PROBLEM — Z00.8 ENCOUNTER FOR WORK CAPABILITY ASSESSMENT: Status: ACTIVE | Noted: 2022-07-13

## 2022-07-13 NOTE — REVIEW OF SYSTEMS
[Joint Pain] : joint pain [Joint Stiffness] : joint stiffness [Muscle Pain] : muscle pain [Fever] : no fever [Eye Pain] : no eye pain [Earache] : no earache [Chest Pain] : no chest pain [Shortness Of Breath] : no shortness of breath [Abdominal Pain] : no abdominal pain [Dysuria] : no dysuria [Dizziness] : no dizziness [Insomnia] : no insomnia [Easy Bruising] : no tendency for easy bruising

## 2022-07-13 NOTE — PHYSICAL EXAM
[FreeTextEntry1] : General: alert and oriented x3. Pleasant. Language:English\par Eyes: extra-ocular movements are intact. No discharge\par HENT: Head:normocephalic, atraumatic. Ears: no discharge. nose: No discharge . Throat: Clear\par Neck: active range of motion 0-80.  tender along the cervical paraspinal muscles. Spurlings Positive bilaterally\par Resp: good air movement\par CVS: regular rhythm. Regular rate\par Abdom: soft nontender\par DWAIN: thoracic spine spasms in the paraspinal muscles.\par Thoracic spine tender in the paraspinal muscles from T2-T12.\par Lumbar Spine: FAROM 0-60.  Is in the lumbar paraspinals from L1-S1\par \par LUE: Shoulder FAROM, MS 4/5 \par Elbow FAROM, MS 4/5, Reflexes 2/4\par Wrist: FAROM, MS 4/5 reflexes 2/4negative Tinel's\par \par RUE: Shoulder FAROM, MS 4/5 \par Elbow FAROM, MS 4/5, Reflexes 2/4\par Wrist: FAROM, MS 4/5 reflexes 2/4negative Tinel's\par \par LLE: Hip: FAROM MS 4-/5\par Knee FAROM, MS 4-/5 reflexes 3/4\par Ankle: FAROM, MS 4-/5 reflexes 2/4\par \par RLE: Hip: FAROM MS 4-/5\par Knee FAROM, MS 4-/5 reflexes 3/4\par Ankle: FAROM, MS 4-/5 reflexes 2/4\par NS: RUE:sensation is intact to light touch, pinprick and proprioception\par \par LUE:sensation is intact to light touch, pinprick and proprioception\par \par RLE:sensation is intact to light touch, pinprick and proprioception.\par Negative Fabers. Negative FAIRS.Positive SLR 0-60\par \par LLE:sensation is intact to light touch, pinprick and proprioception\par Negative Fabers. Negative FAIRS. Positive SLR0-60\par \par Gait: .spontaneous, reciprocal, and safe without an ass
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
## 2022-07-26 ENCOUNTER — APPOINTMENT (OUTPATIENT)
Dept: INTERNAL MEDICINE | Facility: CLINIC | Age: 47
End: 2022-07-26

## 2022-08-11 ENCOUNTER — APPOINTMENT (OUTPATIENT)
Dept: PHYSICAL MEDICINE AND REHAB | Facility: CLINIC | Age: 47
End: 2022-08-11

## 2022-12-30 ENCOUNTER — APPOINTMENT (OUTPATIENT)
Dept: INTERNAL MEDICINE | Facility: CLINIC | Age: 47
End: 2022-12-30
Payer: COMMERCIAL

## 2022-12-30 VITALS
HEIGHT: 65 IN | OXYGEN SATURATION: 98 % | WEIGHT: 164 LBS | DIASTOLIC BLOOD PRESSURE: 80 MMHG | TEMPERATURE: 97.5 F | HEART RATE: 99 BPM | RESPIRATION RATE: 17 BRPM | BODY MASS INDEX: 27.32 KG/M2 | SYSTOLIC BLOOD PRESSURE: 120 MMHG

## 2022-12-30 DIAGNOSIS — R19.7 DIARRHEA, UNSPECIFIED: ICD-10-CM

## 2022-12-30 DIAGNOSIS — M54.9 DORSALGIA, UNSPECIFIED: ICD-10-CM

## 2022-12-30 DIAGNOSIS — K52.9 NONINFECTIVE GASTROENTERITIS AND COLITIS, UNSPECIFIED: ICD-10-CM

## 2022-12-30 DIAGNOSIS — R11.2 NAUSEA WITH VOMITING, UNSPECIFIED: ICD-10-CM

## 2022-12-30 PROCEDURE — 99214 OFFICE O/P EST MOD 30 MIN: CPT | Mod: 25

## 2022-12-30 PROCEDURE — 99401 PREV MED CNSL INDIV APPRX 15: CPT

## 2022-12-31 PROBLEM — R19.7 DIARRHEA: Status: ACTIVE | Noted: 2022-12-30

## 2022-12-31 PROBLEM — M54.9 BACK PAIN: Status: ACTIVE | Noted: 2022-06-17

## 2022-12-31 PROBLEM — R11.2 NAUSEA AND VOMITING: Status: ACTIVE | Noted: 2022-12-31

## 2022-12-31 NOTE — END OF VISIT
[FreeTextEntry4] : I Stew Echevarria, am scribing for and in the presence of Dr. Woody, the following sections of:  HISTORY OF PRESENT ILLNESS, PAST MEDICAL/FAMILY/SOCIAL HISTORY, ROS, VITALS, PE, DISPOSITION

## 2022-12-31 NOTE — REVIEW OF SYSTEMS
[Negative] : Heme/Lymph [Fatigue] : fatigue [Abdominal Pain] : abdominal pain [Nausea] : nausea [Diarrhea] : diarrhea [Vomiting] : vomiting [Back Pain] : back pain

## 2022-12-31 NOTE — HEALTH RISK ASSESSMENT
[Good] : ~his/her~  mood as  good [Never] : Never [No] : No [No falls in past year] : Patient reported no falls in the past year [0] : 2) Feeling down, depressed, or hopeless: Not at all (0) [None] : None [With Significant Other] : lives with significant other [] :  [# Of Children ___] : has [unfilled] children [Sexually Active] : sexually active [Fully functional (bathing, dressing, toileting, transferring, walking, feeding)] : Fully functional (bathing, dressing, toileting, transferring, walking, feeding) [de-identified] : Minimal [de-identified] : Standard Mauritian [Language] : denies difficulty with language [Behavior] : denies difficulty with behavior [Learning/Retaining New Information] : denies difficulty learning/retaining new information [Handling Complex Tasks] : denies difficulty handling complex tasks [Reasoning] : denies difficulty with reasoning [Spatial Ability and Orientation] : denies difficulty with spatial ability and orientation

## 2022-12-31 NOTE — COUNSELING
[Fall prevention counseling provided] : Fall prevention counseling provided [Adequate lighting] : Adequate lighting [No throw rugs] : No throw rugs [Use proper foot wear] : Use proper foot wear [Use recommended devices] : Use recommended devices [Behavioral health counseling provided] : Behavioral health counseling provided [Sleep ___ hours/day] : Sleep [unfilled] hours/day [Engage in a relaxing activity] : Engage in a relaxing activity [Plan in advance] : Plan in advance [Good understanding] : Patient has a good understanding of lifestyle changes and steps needed to achieve self management goal [de-identified] : Symptomatic patients : Test for influenza, if positive, treat for influenza and do not continue below. \par 1. Fever plus cough or shortness of breath : Test for RVP and COVID-19.\par 2.Indirect, circumstantial or unclear exposure to COVID-19, or other concerning cases not meeting above criteria: Please call [facility] to discuss testing. \par +++ All above cases must be reported to the [facility] registry. +++\par \par Asymptomatic patients: \par 1. Known first-degree direct-contact exposure to positive COVID-19 patient but asymptomatic: No testing PLUS 14 day self-quarantine. Pt to call if symptoms develop. Report to [facility] Registry.\par 2. No known exposure and asymptomatic, referred from outside healthcare organization: Please call [facility] to discuss testing. \par 3.All other asymptomatic patients with no known exposures: no testing, no exceptions.\par \par Recommended avoiding spicy foods milk products fried foods.  Recommended increasing fluids such as Pedialyte or Gatorade.  Recommended avoiding NSAIDs.  Given brat diet. Avoid tomato products, mint, citrus (drinks / fruit), fried fatty foods, caffeine, chocolate.Avoid food / drink ,2 hours prior to bedtime / napping:\par take medications as advised. Rest, ice/heat massage therapy/myofascial  release no heavy lift or twisting avoid prolonged positioning. We reviewed proper lifting mechanics continue with a home stretching program. Return to office in 2 weeks\par \par Patient was advised to go to ER should diarrhea worsen or if patient became weak dizzy short of breath or had developed chest pain.\par Face-to-face counseling 15 minutes

## 2022-12-31 NOTE — PHYSICAL EXAM
[No Acute Distress] : no acute distress [Well Nourished] : well nourished [Well Developed] : well developed [Well-Appearing] : well-appearing [Normal Sclera/Conjunctiva] : normal sclera/conjunctiva [PERRL] : pupils equal round and reactive to light [EOMI] : extraocular movements intact [Normal Outer Ear/Nose] : the outer ears and nose were normal in appearance [Normal Oropharynx] : the oropharynx was normal [No JVD] : no jugular venous distention [No Lymphadenopathy] : no lymphadenopathy [Supple] : supple [Thyroid Normal, No Nodules] : the thyroid was normal and there were no nodules present [No Respiratory Distress] : no respiratory distress  [No Accessory Muscle Use] : no accessory muscle use [Clear to Auscultation] : lungs were clear to auscultation bilaterally [Normal Rate] : normal rate  [Regular Rhythm] : with a regular rhythm [Normal S1, S2] : normal S1 and S2 [No Murmur] : no murmur heard [No Carotid Bruits] : no carotid bruits [No Abdominal Bruit] : a ~M bruit was not heard ~T in the abdomen [No Varicosities] : no varicosities [Pedal Pulses Present] : the pedal pulses are present [No Edema] : there was no peripheral edema [No Palpable Aorta] : no palpable aorta [No Extremity Clubbing/Cyanosis] : no extremity clubbing/cyanosis [Soft] : abdomen soft [Non-distended] : non-distended [No Masses] : no abdominal mass palpated [No HSM] : no HSM [Normal Bowel Sounds] : normal bowel sounds [Normal Posterior Cervical Nodes] : no posterior cervical lymphadenopathy [Normal Anterior Cervical Nodes] : no anterior cervical lymphadenopathy [No CVA Tenderness] : no CVA  tenderness [No Spinal Tenderness] : no spinal tenderness [No Joint Swelling] : no joint swelling [Grossly Normal Strength/Tone] : grossly normal strength/tone [No Rash] : no rash [Coordination Grossly Intact] : coordination grossly intact [No Focal Deficits] : no focal deficits [Normal Gait] : normal gait [Deep Tendon Reflexes (DTR)] : deep tendon reflexes were 2+ and symmetric [Normal Affect] : the affect was normal [Normal Insight/Judgement] : insight and judgment were intact [Declined Breast Exam] : declined breast exam  [Declined Rectal Exam] : declined rectal exam [Normal] : affect was normal and insight and judgment were intact [de-identified] : mild epigastric tenderness

## 2022-12-31 NOTE — ADDENDUM
[FreeTextEntry1] : Patient was made aware that if diarrhea did not stop and she could not keep food down to go to the ER for blood work imaging and IV fluids.  If on the other hand patient remained stable but diarrhea persisted patient was told to return to the office early next week for blood test and stool studies.

## 2022-12-31 NOTE — HISTORY OF PRESENT ILLNESS
[FreeTextEntry1] : N/V/D---- x4 days.  Patient states she has no fever or chills.  Originally was thinking that she got food poisoning from chicken however her daughter has same symptoms and did not eat same food most likely viral... Patient states that vomiting has subsided still has some nausea on Pepto-Bismol and diarrhea persists.  Without abdominal pain.  General health maintenance= 4 lumbar radiculopathy low vitamin D and rotator cuff injury [de-identified] : 46 y/o F presents with cc of emesis/nausea, diarrhea\par States she has been noting persistent watery/dark diarrhea, (on Pepto) nausea, emesis onset x 3 days\par Mild abdominal pain noted, with mild fever at start.\par Denies urinary complaints\par \par Of note, family contacts with similar symptoms. States she ate sandwich with chicken.\par Affirms she has been drinking fluids.

## 2023-04-20 ENCOUNTER — APPOINTMENT (OUTPATIENT)
Dept: INTERNAL MEDICINE | Facility: CLINIC | Age: 48
End: 2023-04-20
Payer: COMMERCIAL

## 2023-04-20 VITALS
BODY MASS INDEX: 27.82 KG/M2 | RESPIRATION RATE: 17 BRPM | SYSTOLIC BLOOD PRESSURE: 118 MMHG | HEIGHT: 65 IN | OXYGEN SATURATION: 99 % | TEMPERATURE: 96.6 F | WEIGHT: 167 LBS | DIASTOLIC BLOOD PRESSURE: 80 MMHG | HEART RATE: 98 BPM

## 2023-04-20 DIAGNOSIS — K59.00 CONSTIPATION, UNSPECIFIED: ICD-10-CM

## 2023-04-20 DIAGNOSIS — N64.4 MASTODYNIA: ICD-10-CM

## 2023-04-20 DIAGNOSIS — R11.0 NAUSEA: ICD-10-CM

## 2023-04-20 PROCEDURE — 36415 COLL VENOUS BLD VENIPUNCTURE: CPT

## 2023-04-20 PROCEDURE — 99214 OFFICE O/P EST MOD 30 MIN: CPT | Mod: 25

## 2023-04-20 PROCEDURE — 83014 H PYLORI DRUG ADMIN: CPT

## 2023-04-20 PROCEDURE — 99401 PREV MED CNSL INDIV APPRX 15: CPT

## 2023-04-20 RX ORDER — CYCLOBENZAPRINE HYDROCHLORIDE 10 MG/1
10 TABLET, FILM COATED ORAL
Qty: 30 | Refills: 1 | Status: DISCONTINUED | COMMUNITY
Start: 2020-04-17 | End: 2023-04-20

## 2023-04-20 RX ORDER — PHENOBARBITAL, HYOSCYAMINE SULFATE, ATROPINE SULFATE, SCOPOLAMINE HYDROBROMIDE 16.2; .1037; .0194; .0065 MG/1; MG/1; MG/1; MG/1
16.2 TABLET ORAL
Qty: 21 | Refills: 1 | Status: DISCONTINUED | COMMUNITY
Start: 2022-12-30 | End: 2023-04-20

## 2023-04-20 RX ORDER — MELOXICAM 15 MG/1
15 TABLET ORAL
Qty: 30 | Refills: 3 | Status: DISCONTINUED | COMMUNITY
Start: 2019-10-28 | End: 2023-04-20

## 2023-04-20 RX ORDER — MELOXICAM 15 MG/1
15 TABLET ORAL
Qty: 30 | Refills: 1 | Status: DISCONTINUED | COMMUNITY
Start: 2020-05-15 | End: 2023-04-20

## 2023-04-20 RX ORDER — GABAPENTIN 300 MG/1
300 CAPSULE ORAL
Qty: 90 | Refills: 1 | Status: DISCONTINUED | COMMUNITY
Start: 2021-03-31 | End: 2023-04-20

## 2023-04-20 RX ORDER — METHYLPREDNISOLONE 4 MG/1
4 TABLET ORAL
Qty: 1 | Refills: 0 | Status: DISCONTINUED | COMMUNITY
Start: 2019-10-14 | End: 2023-04-20

## 2023-04-20 RX ORDER — MELOXICAM 15 MG/1
15 TABLET ORAL
Qty: 30 | Refills: 0 | Status: DISCONTINUED | COMMUNITY
Start: 2022-06-17 | End: 2023-04-20

## 2023-04-20 RX ORDER — METHYLPREDNISOLONE 4 MG/1
4 TABLET ORAL
Qty: 1 | Refills: 0 | Status: DISCONTINUED | COMMUNITY
Start: 2021-07-07 | End: 2023-04-20

## 2023-04-20 RX ORDER — CYCLOBENZAPRINE HYDROCHLORIDE 5 MG/1
5 TABLET, FILM COATED ORAL
Qty: 30 | Refills: 0 | Status: DISCONTINUED | COMMUNITY
Start: 2022-07-12 | End: 2023-04-20

## 2023-04-20 RX ORDER — CYCLOBENZAPRINE HYDROCHLORIDE 10 MG/1
10 TABLET, FILM COATED ORAL
Qty: 30 | Refills: 1 | Status: DISCONTINUED | COMMUNITY
Start: 2019-12-02 | End: 2023-04-20

## 2023-04-20 RX ORDER — MELOXICAM 15 MG/1
15 TABLET ORAL DAILY
Qty: 30 | Refills: 0 | Status: DISCONTINUED | COMMUNITY
Start: 2020-09-30 | End: 2023-04-20

## 2023-04-20 RX ORDER — ONDANSETRON 8 MG/1
8 TABLET, ORALLY DISINTEGRATING ORAL EVERY 6 HOURS
Qty: 20 | Refills: 0 | Status: DISCONTINUED | COMMUNITY
Start: 2022-12-30 | End: 2023-04-20

## 2023-04-20 RX ORDER — MELOXICAM 15 MG/1
15 TABLET ORAL DAILY
Qty: 7 | Refills: 0 | Status: DISCONTINUED | COMMUNITY
Start: 2020-02-26 | End: 2023-04-20

## 2023-04-20 RX ORDER — IBUPROFEN 600 MG/1
600 TABLET, FILM COATED ORAL 3 TIMES DAILY
Qty: 90 | Refills: 0 | Status: DISCONTINUED | COMMUNITY
Start: 2022-07-12 | End: 2023-04-20

## 2023-04-20 RX ORDER — TIZANIDINE 2 MG/1
2 TABLET ORAL
Qty: 30 | Refills: 2 | Status: DISCONTINUED | COMMUNITY
Start: 2022-06-17 | End: 2023-04-20

## 2023-04-20 RX ORDER — METHYLPREDNISOLONE 4 MG/1
4 TABLET ORAL
Qty: 1 | Refills: 0 | Status: DISCONTINUED | COMMUNITY
Start: 2021-06-11 | End: 2023-04-20

## 2023-04-20 RX ORDER — FAMOTIDINE 20 MG/1
20 TABLET, FILM COATED ORAL
Qty: 60 | Refills: 0 | Status: ACTIVE | COMMUNITY
Start: 2023-04-20 | End: 1900-01-01

## 2023-04-20 RX ORDER — CYCLOBENZAPRINE HYDROCHLORIDE 10 MG/1
10 TABLET, FILM COATED ORAL
Qty: 30 | Refills: 0 | Status: DISCONTINUED | COMMUNITY
Start: 2019-11-11 | End: 2023-04-20

## 2023-04-20 NOTE — PHYSICAL EXAM
[No Respiratory Distress] : no respiratory distress  [Normal Appearance] : normal in appearance [No Masses] : no palpable masses [No Nipple Discharge] : no nipple discharge [No Axillary Lymphadenopathy] : no axillary lymphadenopathy [Normal] : soft, non-tender, non-distended, no masses palpated, no HSM and normal bowel sounds

## 2023-04-20 NOTE — ASSESSMENT
[FreeTextEntry1] : Abdominal pain epigastric/nausea check labs.  Check urea breath test rule out H. pylori.  Trial famotidine.  Possibility of GERD.  Gastric diet discussed with the patient 8 minutes including but not limited to avoidance of caffeinated products juices raw vegetables and fruit.  Avoid late meals weight loss.  If symptoms will persist will consider GI with endoscopic evaluation\par Constipation high-fiber diet increase water intake\par Right breast pain we will obtain right breast diagnostic mammogram ultrasound\par Elevated WBCs repeat CBC\par Follow-up pending labs/2 weeks\par

## 2023-04-20 NOTE — HISTORY OF PRESENT ILLNESS
[FreeTextEntry1] : Sick visit [de-identified] : Patient presents complaining of few month history of upper abdominal pain burning intermittent present with without food intake.  Positive nausea positive constipation.  Denies fever vomiting diarrhea blood in the stool changes in weight\par Patient is complaining of right breast pain on and off for the last few month.  Denies family history of breast cancer discharge lump overdue for mammogram

## 2023-04-22 LAB
ALBUMIN SERPL ELPH-MCNC: 4.1 G/DL
ALP BLD-CCNC: 85 U/L
ALT SERPL-CCNC: 15 U/L
AMYLASE/CREAT SERPL: 68 U/L
ANION GAP SERPL CALC-SCNC: 13 MMOL/L
AST SERPL-CCNC: 14 U/L
BASOPHILS # BLD AUTO: 0.05 K/UL
BASOPHILS NFR BLD AUTO: 0.5 %
BILIRUB SERPL-MCNC: <0.2 MG/DL
BUN SERPL-MCNC: 7 MG/DL
CALCIUM SERPL-MCNC: 9.7 MG/DL
CHLORIDE SERPL-SCNC: 104 MMOL/L
CO2 SERPL-SCNC: 22 MMOL/L
CREAT SERPL-MCNC: 0.59 MG/DL
EGFR: 112 ML/MIN/1.73M2
EOSINOPHIL # BLD AUTO: 0.29 K/UL
EOSINOPHIL NFR BLD AUTO: 3 %
GLUCOSE SERPL-MCNC: 91 MG/DL
HCT VFR BLD CALC: 41.8 %
HGB BLD-MCNC: 12.4 G/DL
IMM GRANULOCYTES NFR BLD AUTO: 0.3 %
LPL SERPL-CCNC: 30 U/L
LYMPHOCYTES # BLD AUTO: 2.55 K/UL
LYMPHOCYTES NFR BLD AUTO: 26.7 %
MAN DIFF?: NORMAL
MCHC RBC-ENTMCNC: 27 PG
MCHC RBC-ENTMCNC: 29.7 GM/DL
MCV RBC AUTO: 91.1 FL
MONOCYTES # BLD AUTO: 0.55 K/UL
MONOCYTES NFR BLD AUTO: 5.8 %
NEUTROPHILS # BLD AUTO: 6.08 K/UL
NEUTROPHILS NFR BLD AUTO: 63.7 %
PLATELET # BLD AUTO: 406 K/UL
POTASSIUM SERPL-SCNC: 4.4 MMOL/L
PROT SERPL-MCNC: 7 G/DL
RBC # BLD: 4.59 M/UL
RBC # FLD: 13.8 %
SODIUM SERPL-SCNC: 139 MMOL/L
UREA BREATH TEST QL: NEGATIVE
WBC # FLD AUTO: 9.55 K/UL

## 2023-04-24 ENCOUNTER — NON-APPOINTMENT (OUTPATIENT)
Age: 48
End: 2023-04-24

## 2023-05-04 ENCOUNTER — APPOINTMENT (OUTPATIENT)
Dept: INTERNAL MEDICINE | Facility: CLINIC | Age: 48
End: 2023-05-04
Payer: COMMERCIAL

## 2023-05-04 ENCOUNTER — NON-APPOINTMENT (OUTPATIENT)
Age: 48
End: 2023-05-04

## 2023-05-04 VITALS
TEMPERATURE: 97.1 F | DIASTOLIC BLOOD PRESSURE: 80 MMHG | RESPIRATION RATE: 17 BRPM | BODY MASS INDEX: 27.66 KG/M2 | SYSTOLIC BLOOD PRESSURE: 120 MMHG | HEART RATE: 85 BPM | OXYGEN SATURATION: 98 % | HEIGHT: 65 IN | WEIGHT: 166 LBS

## 2023-05-04 DIAGNOSIS — D72.829 ELEVATED WHITE BLOOD CELL COUNT, UNSPECIFIED: ICD-10-CM

## 2023-05-04 DIAGNOSIS — M79.671 PAIN IN RIGHT FOOT: ICD-10-CM

## 2023-05-04 DIAGNOSIS — M79.672 PAIN IN RIGHT FOOT: ICD-10-CM

## 2023-05-04 DIAGNOSIS — R79.89 OTHER SPECIFIED ABNORMAL FINDINGS OF BLOOD CHEMISTRY: ICD-10-CM

## 2023-05-04 DIAGNOSIS — R92.2 INCONCLUSIVE MAMMOGRAM: ICD-10-CM

## 2023-05-04 DIAGNOSIS — R10.13 EPIGASTRIC PAIN: ICD-10-CM

## 2023-05-04 DIAGNOSIS — G89.29 PAIN IN RIGHT FOOT: ICD-10-CM

## 2023-05-04 PROCEDURE — 99214 OFFICE O/P EST MOD 30 MIN: CPT

## 2023-05-04 NOTE — PHYSICAL EXAM
[No Respiratory Distress] : no respiratory distress  [Pedal Pulses Present] : the pedal pulses are present [No Edema] : there was no peripheral edema [Normal] : no joint swelling and grossly normal strength and tone

## 2023-05-04 NOTE — HISTORY OF PRESENT ILLNESS
[FreeTextEntry1] : Follow-up [de-identified] : Patient presents for follow-up results/abdominal pain.  Unremarkable labs negative H. pylori testing negative abdominal ultrasound\par Patient is reporting 90% improvement in abdominal pain with change in diet and famotidine\par Mammogram was negative for malignancy did reveal benign bilateral calcifications and benign lymph nodes\par Patient is complaining of longstanding history of pain in both feet bottom moderate intermittent worse after kneeling

## 2023-05-04 NOTE — ASSESSMENT
[FreeTextEntry1] : Dense breast referred for breast ultrasound\par Chronic pain in both feet referred to podiatry\par Abdominal pain epigastric 90% improvement continue with diet and famotidine\par Breast pain no acute pathology does have benign lymph nodes could be accounted for pain.  Will refer for breast ultrasound given dense breast\par Elevated white blood cells history normalized\par Elevated platelet count trending down likely reactive\par Follow-up pending testing/3-month

## 2024-03-01 ENCOUNTER — NON-APPOINTMENT (OUTPATIENT)
Age: 49
End: 2024-03-01

## 2024-03-01 DIAGNOSIS — Z80.9 FAMILY HISTORY OF MALIGNANT NEOPLASM, UNSPECIFIED: ICD-10-CM

## 2024-03-01 DIAGNOSIS — Z82.49 FAMILY HISTORY OF ISCHEMIC HEART DISEASE AND OTHER DISEASES OF THE CIRCULATORY SYSTEM: ICD-10-CM

## 2024-03-01 DIAGNOSIS — Z86.018 PERSONAL HISTORY OF OTHER BENIGN NEOPLASM: ICD-10-CM

## 2024-03-01 DIAGNOSIS — R39.81 FUNCTIONAL URINARY INCONTINENCE: ICD-10-CM

## 2024-03-01 DIAGNOSIS — Z78.9 OTHER SPECIFIED HEALTH STATUS: ICD-10-CM

## 2024-07-05 NOTE — ED ADULT TRIAGE NOTE - BEFAST FACE
Medications: medications verified and updated  Added preferred pharmacy    Patient would like communication of their results via:    Cell Phone:   Telephone Information:   Mobile [phone]     Okay to leave a message containing results? Yes     Physician requesting Consult: Alvaro Gonzalez CNP  PCP: Seda Foster MD    Tiffanie is a retired and presents today with 6 days ago history of rhino rocket removed- placed on 6/30/24. Patient is on blood thinners. Anterior and posterior placement of right nostril, posterior placement of left nostril. [place] a pop in head with a gush of blood.   Had a nerve ablation with Dr Jarrett on 6/18 in clinic. Hemoglobin dropped to 8.1 in the ED. Has to have labs drawn today. Was admitted until 7/2 with lab checks.     Have you or a family member seen Dr. Poe before?  NO  If yes, who and what for?     Past Medical History:   Diagnosis Date    Diffuse cystic mastopathy     Fibrocystic Breast Disease    Hyperlipidemia     Hypertension     Obstructive sleep apnea     Osteopenia      Past Surgical History:   Procedure Laterality Date    Breast biopsy Right 2018    Radial scar per radiologist from [facility] 2021    Cryocautery of cervix  01/01/1997    mild dysplasia    Hysteroscopy,bio endo/polyptmy  12/01/2002    Hysteroscopy, with Bx    Pap smear, routine (inc 09244)  02/01/2000    WNL    Past surgical history  02/09/2004    sinus surgery by Dr Bray    Xray mammogram screening bilat  02/01/2001    Nodule L breast has ultrasounds q 6 months-needle biopsy     Current Outpatient Medications   Medication Sig Dispense Refill    triamcinolone (ARISTOCORT) 0.1 % cream Apply topically 2 times daily. 15 g 1    ipratropium (ATROVENT) 0.03 % nasal spray Spray 2 sprays in each nostril every 12 hours. 30 mL 12    zolpidem (AMBIEN) 5 MG tablet Take 1 tablet by mouth nightly as needed for Sleep. 30 tablet 5    naproxen 220 MG capsule  (Patient not taking: Reported on 6/28/2024)      Acetaminophen  (Tylenol) 325 MG Cap Tylenol      LORazepam (ATIVAN) 0.5 MG tablet Take 1 tablet by mouth daily as needed for Anxiety. 30 tablet 2    metoPROLOL succinate (TOPROL-XL) 25 MG 24 hr tablet TAKE 1/2 (ONE-HALF) TABLET BY MOUTH TWICE DAILY      enalapril (VASOTEC) 20 MG tablet Take 20 mg by mouth in the morning and 20 mg in the evening.      rosuvastatin (CRESTOR) 5 MG tablet Take 5 mg by mouth daily.      ezetimibe (ZETIA) 10 MG tablet Take 10 mg by mouth daily.      MULTIVITAMINS TABS   PO 1 tab Q D 0 0    CALCIUM 500 TABS 500 MG PO 2 tab Q D 0 0     No current facility-administered medications for this visit.     ALLERGIES:  Amlodipine, Escitalopram, and Trazodone    Social History:   Do you smoke? No. Did you ever smoke? No  Do you drink alcohol? No    Family History:   Does anyone in your immediate family have:   Hearing Loss:  YES  Anesthesia Complications:   NO  Bleeding Disorders: NO    Have you ever had kidney dysfunction or dialysis?: No  Have you ever had (MRSA)?  NO    Systems Review:   In the PAST 2 weeks have you experienced any of the following?  Eyes:  Vision changes: NO  Neurological:  Headaches:NO  Ears: Hearing Loss: NO  Ringing: NO  Dizziness: NO         No

## 2024-08-22 ENCOUNTER — APPOINTMENT (OUTPATIENT)
Dept: INTERNAL MEDICINE | Facility: CLINIC | Age: 49
End: 2024-08-22

## 2024-10-10 NOTE — ED ADULT TRIAGE NOTE - NS ED TRIAGE AVPU SCALE
Initiate Treatment: Keto shampoo\\nClobetasol scalp solution
Detail Level: Zone
Alert-The patient is alert, awake and responds to voice. The patient is oriented to time, place, and person. The triage nurse is able to obtain subjective information.
Render In Strict Bullet Format?: No